# Patient Record
Sex: FEMALE | Race: WHITE | Employment: OTHER | URBAN - METROPOLITAN AREA
[De-identification: names, ages, dates, MRNs, and addresses within clinical notes are randomized per-mention and may not be internally consistent; named-entity substitution may affect disease eponyms.]

---

## 2019-01-01 ENCOUNTER — APPOINTMENT (OUTPATIENT)
Dept: MRI IMAGING | Age: 84
DRG: 391 | End: 2019-01-01
Payer: MEDICARE

## 2019-01-01 ENCOUNTER — APPOINTMENT (OUTPATIENT)
Dept: CT IMAGING | Age: 84
DRG: 391 | End: 2019-01-01
Payer: MEDICARE

## 2019-01-01 ENCOUNTER — HOSPITAL ENCOUNTER (EMERGENCY)
Age: 84
Discharge: HOME | End: 2019-12-27
Attending: EMERGENCY MEDICINE
Payer: MEDICARE

## 2019-01-01 ENCOUNTER — APPOINTMENT (OUTPATIENT)
Dept: GENERAL RADIOLOGY | Age: 84
DRG: 391 | End: 2019-01-01
Payer: MEDICARE

## 2019-01-01 ENCOUNTER — APPOINTMENT (OUTPATIENT)
Dept: CT IMAGING | Age: 84
End: 2019-01-01
Payer: MEDICARE

## 2019-01-01 ENCOUNTER — TELEPHONE (OUTPATIENT)
Dept: OTHER | Facility: CLINIC | Age: 84
End: 2019-01-01

## 2019-01-01 ENCOUNTER — HOSPITAL ENCOUNTER (INPATIENT)
Age: 84
LOS: 5 days | Discharge: NURSING, REHAB OR LONG-TERM CARE | DRG: 391 | End: 2019-12-22
Attending: EMERGENCY MEDICINE | Admitting: INTERNAL MEDICINE
Payer: MEDICARE

## 2019-01-01 VITALS
OXYGEN SATURATION: 91 % | BODY MASS INDEX: 24.38 KG/M2 | WEIGHT: 132.5 LBS | HEIGHT: 62 IN | TEMPERATURE: 97 F | HEART RATE: 71 BPM | DIASTOLIC BLOOD PRESSURE: 59 MMHG | RESPIRATION RATE: 14 BRPM | SYSTOLIC BLOOD PRESSURE: 132 MMHG

## 2019-01-01 VITALS
DIASTOLIC BLOOD PRESSURE: 84 MMHG | HEIGHT: 62 IN | WEIGHT: 132 LBS | TEMPERATURE: 97.9 F | HEART RATE: 64 BPM | OXYGEN SATURATION: 96 % | RESPIRATION RATE: 20 BRPM | BODY MASS INDEX: 24.29 KG/M2 | SYSTOLIC BLOOD PRESSURE: 159 MMHG

## 2019-01-01 DIAGNOSIS — K59.00 CONSTIPATION, UNSPECIFIED CONSTIPATION TYPE: ICD-10-CM

## 2019-01-01 DIAGNOSIS — N39.0 URINARY TRACT INFECTION WITHOUT HEMATURIA, SITE UNSPECIFIED: ICD-10-CM

## 2019-01-01 DIAGNOSIS — R10.32 ABDOMINAL PAIN, LEFT LOWER QUADRANT: Primary | ICD-10-CM

## 2019-01-01 DIAGNOSIS — K57.90 DIVERTICULOSIS: ICD-10-CM

## 2019-01-01 LAB
ALBUMIN SERPL-MCNC: 2.8 G/DL (ref 3.5–5.2)
ALBUMIN SERPL-MCNC: 2.9 G/DL (ref 3.5–5.2)
ALBUMIN SERPL-MCNC: 3.2 G/DL (ref 3.5–5.2)
ALBUMIN SERPL-MCNC: 4 G/DL (ref 3.5–5.2)
ALP BLD-CCNC: 123 U/L (ref 35–104)
ALP BLD-CCNC: 150 U/L (ref 35–104)
ALP BLD-CCNC: 164 U/L (ref 35–104)
ALP BLD-CCNC: 221 U/L (ref 35–104)
ALT SERPL-CCNC: 17 U/L (ref 0–32)
ALT SERPL-CCNC: 27 U/L (ref 0–32)
ALT SERPL-CCNC: 44 U/L (ref 0–32)
ALT SERPL-CCNC: 9 U/L (ref 0–32)
AMMONIA: 27 UMOL/L (ref 11–51)
ANION GAP SERPL CALCULATED.3IONS-SCNC: 10 MMOL/L (ref 7–16)
ANION GAP SERPL CALCULATED.3IONS-SCNC: 11 MMOL/L (ref 7–16)
ANION GAP SERPL CALCULATED.3IONS-SCNC: 11 MMOL/L (ref 7–16)
ANION GAP SERPL CALCULATED.3IONS-SCNC: 12 MMOL/L (ref 7–16)
ANION GAP SERPL CALCULATED.3IONS-SCNC: 12 MMOL/L (ref 7–16)
ANION GAP SERPL CALCULATED.3IONS-SCNC: 14 MMOL/L (ref 7–16)
ANION GAP SERPL CALCULATED.3IONS-SCNC: 14 MMOL/L (ref 7–16)
ANISOCYTOSIS: ABNORMAL
AST SERPL-CCNC: 30 U/L (ref 0–31)
AST SERPL-CCNC: 32 U/L (ref 0–31)
AST SERPL-CCNC: 53 U/L (ref 0–31)
AST SERPL-CCNC: 92 U/L (ref 0–31)
BACTERIA: ABNORMAL /HPF
BACTERIA: ABNORMAL /HPF
BASOPHILS ABSOLUTE: 0.08 E9/L (ref 0–0.2)
BASOPHILS ABSOLUTE: 0.11 E9/L (ref 0–0.2)
BASOPHILS ABSOLUTE: 0.2 E9/L (ref 0–0.2)
BASOPHILS RELATIVE PERCENT: 1.1 % (ref 0–2)
BASOPHILS RELATIVE PERCENT: 1.7 % (ref 0–2)
BASOPHILS RELATIVE PERCENT: 3.5 % (ref 0–2)
BILIRUB SERPL-MCNC: 1.1 MG/DL (ref 0–1.2)
BILIRUB SERPL-MCNC: 1.2 MG/DL (ref 0–1.2)
BILIRUB SERPL-MCNC: 3.3 MG/DL (ref 0–1.2)
BILIRUB SERPL-MCNC: 4.8 MG/DL (ref 0–1.2)
BILIRUBIN DIRECT: 0.6 MG/DL (ref 0–0.3)
BILIRUBIN DIRECT: 1.6 MG/DL (ref 0–0.3)
BILIRUBIN URINE: NEGATIVE
BILIRUBIN URINE: NEGATIVE
BILIRUBIN, INDIRECT: 0.6 MG/DL (ref 0–1)
BILIRUBIN, INDIRECT: 1.7 MG/DL (ref 0–1)
BLASTS RELATIVE PERCENT: 0.9 % (ref 0–0)
BLOOD CULTURE, ROUTINE: NORMAL
BLOOD, URINE: ABNORMAL
BLOOD, URINE: NEGATIVE
BUN BLDV-MCNC: 31 MG/DL (ref 8–23)
BUN BLDV-MCNC: 31 MG/DL (ref 8–23)
BUN BLDV-MCNC: 34 MG/DL (ref 8–23)
BUN BLDV-MCNC: 35 MG/DL (ref 8–23)
BUN BLDV-MCNC: 35 MG/DL (ref 8–23)
BUN BLDV-MCNC: 36 MG/DL (ref 8–23)
BUN BLDV-MCNC: 36 MG/DL (ref 8–23)
CALCIUM SERPL-MCNC: 8.3 MG/DL (ref 8.6–10.2)
CALCIUM SERPL-MCNC: 8.3 MG/DL (ref 8.6–10.2)
CALCIUM SERPL-MCNC: 8.5 MG/DL (ref 8.6–10.2)
CALCIUM SERPL-MCNC: 8.6 MG/DL (ref 8.6–10.2)
CALCIUM SERPL-MCNC: 8.7 MG/DL (ref 8.6–10.2)
CALCIUM SERPL-MCNC: 8.7 MG/DL (ref 8.6–10.2)
CALCIUM SERPL-MCNC: 9.4 MG/DL (ref 8.6–10.2)
CASTS: ABNORMAL /LPF
CHLORIDE BLD-SCNC: 103 MMOL/L (ref 98–107)
CHLORIDE BLD-SCNC: 104 MMOL/L (ref 98–107)
CHLORIDE BLD-SCNC: 108 MMOL/L (ref 98–107)
CHLORIDE BLD-SCNC: 110 MMOL/L (ref 98–107)
CHLORIDE BLD-SCNC: 111 MMOL/L (ref 98–107)
CHLORIDE BLD-SCNC: 112 MMOL/L (ref 98–107)
CHLORIDE BLD-SCNC: 112 MMOL/L (ref 98–107)
CHLORIDE URINE RANDOM: <20 MMOL/L
CLARITY: ABNORMAL
CLARITY: CLEAR
CO2: 21 MMOL/L (ref 22–29)
CO2: 21 MMOL/L (ref 22–29)
CO2: 22 MMOL/L (ref 22–29)
CO2: 22 MMOL/L (ref 22–29)
CO2: 23 MMOL/L (ref 22–29)
CO2: 24 MMOL/L (ref 22–29)
CO2: 25 MMOL/L (ref 22–29)
COLOR: YELLOW
COLOR: YELLOW
CREAT SERPL-MCNC: 2 MG/DL (ref 0.5–1)
CREAT SERPL-MCNC: 2.1 MG/DL (ref 0.5–1)
CREAT SERPL-MCNC: 2.2 MG/DL (ref 0.5–1)
CREAT SERPL-MCNC: 2.5 MG/DL (ref 0.5–1)
CREATININE URINE: 105 MG/DL (ref 29–226)
CREATININE URINE: 116 MG/DL (ref 29–226)
EKG ATRIAL RATE: 64 BPM
EKG ATRIAL RATE: 88 BPM
EKG P AXIS: 51 DEGREES
EKG P AXIS: 76 DEGREES
EKG P-R INTERVAL: 192 MS
EKG P-R INTERVAL: 202 MS
EKG Q-T INTERVAL: 412 MS
EKG Q-T INTERVAL: 470 MS
EKG QRS DURATION: 120 MS
EKG QRS DURATION: 128 MS
EKG QTC CALCULATION (BAZETT): 484 MS
EKG QTC CALCULATION (BAZETT): 498 MS
EKG R AXIS: 53 DEGREES
EKG R AXIS: 8 DEGREES
EKG T AXIS: 148 DEGREES
EKG T AXIS: 151 DEGREES
EKG VENTRICULAR RATE: 64 BPM
EKG VENTRICULAR RATE: 88 BPM
EOSINOPHILS ABSOLUTE: 0.02 E9/L (ref 0.05–0.5)
EOSINOPHILS ABSOLUTE: 0.22 E9/L (ref 0.05–0.5)
EOSINOPHILS ABSOLUTE: 0.88 E9/L (ref 0.05–0.5)
EOSINOPHILS RELATIVE PERCENT: 0.3 % (ref 0–6)
EOSINOPHILS RELATIVE PERCENT: 15.8 % (ref 0–6)
EOSINOPHILS RELATIVE PERCENT: 3.5 % (ref 0–6)
EPITHELIAL CELLS, UA: ABNORMAL /HPF
FERRITIN: 293 NG/ML
FOLATE: 14.1 NG/ML (ref 4.8–24.2)
GFR AFRICAN AMERICAN: 22
GFR AFRICAN AMERICAN: 25
GFR AFRICAN AMERICAN: 27
GFR AFRICAN AMERICAN: 28
GFR NON-AFRICAN AMERICAN: 18 ML/MIN/1.73
GFR NON-AFRICAN AMERICAN: 21 ML/MIN/1.73
GFR NON-AFRICAN AMERICAN: 22 ML/MIN/1.73
GFR NON-AFRICAN AMERICAN: 23 ML/MIN/1.73
GLUCOSE BLD-MCNC: 104 MG/DL (ref 74–99)
GLUCOSE BLD-MCNC: 110 MG/DL (ref 74–99)
GLUCOSE BLD-MCNC: 113 MG/DL (ref 74–99)
GLUCOSE BLD-MCNC: 117 MG/DL (ref 74–99)
GLUCOSE BLD-MCNC: 92 MG/DL (ref 74–99)
GLUCOSE BLD-MCNC: 93 MG/DL (ref 74–99)
GLUCOSE BLD-MCNC: 95 MG/DL (ref 74–99)
GLUCOSE URINE: NEGATIVE MG/DL
GLUCOSE URINE: NEGATIVE MG/DL
HCT VFR BLD CALC: 25.8 % (ref 34–48)
HCT VFR BLD CALC: 25.9 % (ref 34–48)
HCT VFR BLD CALC: 31.1 % (ref 34–48)
HCT VFR BLD CALC: 31.5 % (ref 34–48)
HEMOGLOBIN: 10 G/DL (ref 11.5–15.5)
HEMOGLOBIN: 10.3 G/DL (ref 11.5–15.5)
HEMOGLOBIN: 8 G/DL (ref 11.5–15.5)
HEMOGLOBIN: 8.5 G/DL (ref 11.5–15.5)
HYPOCHROMIA: ABNORMAL
HYPOCHROMIA: ABNORMAL
IMMATURE GRANULOCYTES #: 0.1 E9/L
IMMATURE GRANULOCYTES %: 1.4 % (ref 0–5)
INFLUENZA A BY PCR: NOT DETECTED
INFLUENZA B BY PCR: NOT DETECTED
IRON SATURATION: 83 % (ref 15–50)
IRON: 149 MCG/DL (ref 37–145)
KETONES, URINE: NEGATIVE MG/DL
KETONES, URINE: NEGATIVE MG/DL
L. PNEUMOPHILA SEROGP 1 UR AG: NORMAL
LACTIC ACID: 2 MMOL/L (ref 0.5–2.2)
LACTIC ACID: 2.2 MMOL/L (ref 0.5–2.2)
LEUKOCYTE ESTERASE, URINE: ABNORMAL
LEUKOCYTE ESTERASE, URINE: ABNORMAL
LIPASE: 48 U/L (ref 13–60)
LIPASE: 65 U/L (ref 13–60)
LYMPHOCYTES ABSOLUTE: 0.9 E9/L (ref 1.5–4)
LYMPHOCYTES ABSOLUTE: 1.07 E9/L (ref 1.5–4)
LYMPHOCYTES ABSOLUTE: 2.62 E9/L (ref 1.5–4)
LYMPHOCYTES RELATIVE PERCENT: 15.8 % (ref 20–42)
LYMPHOCYTES RELATIVE PERCENT: 16.5 % (ref 20–42)
LYMPHOCYTES RELATIVE PERCENT: 35.5 % (ref 20–42)
MCH RBC QN AUTO: 36.4 PG (ref 26–35)
MCH RBC QN AUTO: 36.9 PG (ref 26–35)
MCH RBC QN AUTO: 37 PG (ref 26–35)
MCH RBC QN AUTO: 37.1 PG (ref 26–35)
MCHC RBC AUTO-ENTMCNC: 31 % (ref 32–34.5)
MCHC RBC AUTO-ENTMCNC: 32.2 % (ref 32–34.5)
MCHC RBC AUTO-ENTMCNC: 32.7 % (ref 32–34.5)
MCHC RBC AUTO-ENTMCNC: 32.8 % (ref 32–34.5)
MCV RBC AUTO: 112.9 FL (ref 80–99.9)
MCV RBC AUTO: 113.1 FL (ref 80–99.9)
MCV RBC AUTO: 115.2 FL (ref 80–99.9)
MCV RBC AUTO: 117.3 FL (ref 80–99.9)
METAMYELOCYTES RELATIVE PERCENT: 3.5 % (ref 0–1)
MICROALBUMIN UR-MCNC: 172.5 MG/L
MICROALBUMIN/CREAT UR-RTO: 164.3 (ref 0–30)
MONOCYTES ABSOLUTE: 0.25 E9/L (ref 0.1–0.95)
MONOCYTES ABSOLUTE: 0.34 E9/L (ref 0.1–0.95)
MONOCYTES ABSOLUTE: 0.98 E9/L (ref 0.1–0.95)
MONOCYTES RELATIVE PERCENT: 13.3 % (ref 2–12)
MONOCYTES RELATIVE PERCENT: 4.3 % (ref 2–12)
MONOCYTES RELATIVE PERCENT: 6.1 % (ref 2–12)
MYELOCYTE PERCENT: 3.5 % (ref 0–0)
NEUTROPHILS ABSOLUTE: 3.25 E9/L (ref 1.8–7.3)
NEUTROPHILS ABSOLUTE: 3.58 E9/L (ref 1.8–7.3)
NEUTROPHILS ABSOLUTE: 4.66 E9/L (ref 1.8–7.3)
NEUTROPHILS RELATIVE PERCENT: 48.4 % (ref 43–80)
NEUTROPHILS RELATIVE PERCENT: 50.9 % (ref 43–80)
NEUTROPHILS RELATIVE PERCENT: 73.9 % (ref 43–80)
NITRITE, URINE: NEGATIVE
NITRITE, URINE: NEGATIVE
OSMOLALITY URINE: 429 MOSM/KG (ref 300–900)
OVALOCYTES: ABNORMAL
PDW BLD-RTO: 15.9 FL (ref 11.5–15)
PDW BLD-RTO: 15.9 FL (ref 11.5–15)
PDW BLD-RTO: 16.1 FL (ref 11.5–15)
PDW BLD-RTO: 17.2 FL (ref 11.5–15)
PH UA: 6 (ref 5–9)
PH UA: 7 (ref 5–9)
PLATELET # BLD: 105 E9/L (ref 130–450)
PLATELET # BLD: 107 E9/L (ref 130–450)
PLATELET # BLD: 132 E9/L (ref 130–450)
PLATELET # BLD: 187 E9/L (ref 130–450)
PMV BLD AUTO: 12.8 FL (ref 7–12)
PMV BLD AUTO: 13.2 FL (ref 7–12)
PMV BLD AUTO: 13.2 FL (ref 7–12)
PMV BLD AUTO: 13.4 FL (ref 7–12)
POIKILOCYTES: ABNORMAL
POLYCHROMASIA: ABNORMAL
POTASSIUM REFLEX MAGNESIUM: 3.8 MMOL/L (ref 3.5–5)
POTASSIUM REFLEX MAGNESIUM: 4 MMOL/L (ref 3.5–5)
POTASSIUM REFLEX MAGNESIUM: 4.2 MMOL/L (ref 3.5–5)
POTASSIUM REFLEX MAGNESIUM: 4.3 MMOL/L (ref 3.5–5)
POTASSIUM REFLEX MAGNESIUM: 4.4 MMOL/L (ref 3.5–5)
POTASSIUM SERPL-SCNC: 4.2 MMOL/L (ref 3.5–5)
POTASSIUM SERPL-SCNC: 4.9 MMOL/L (ref 3.5–5)
POTASSIUM, UR: 47 MMOL/L
PRO-BNP: 3554 PG/ML (ref 0–450)
PROTEIN UA: ABNORMAL MG/DL
PROTEIN UA: NEGATIVE MG/DL
RBC # BLD: 2.2 E12/L (ref 3.5–5.5)
RBC # BLD: 2.29 E12/L (ref 3.5–5.5)
RBC # BLD: 2.7 E12/L (ref 3.5–5.5)
RBC # BLD: 2.79 E12/L (ref 3.5–5.5)
RBC UA: ABNORMAL /HPF (ref 0–2)
RBC UA: ABNORMAL /HPF (ref 0–2)
SCHISTOCYTES: ABNORMAL
SODIUM BLD-SCNC: 137 MMOL/L (ref 132–146)
SODIUM BLD-SCNC: 141 MMOL/L (ref 132–146)
SODIUM BLD-SCNC: 142 MMOL/L (ref 132–146)
SODIUM BLD-SCNC: 142 MMOL/L (ref 132–146)
SODIUM BLD-SCNC: 144 MMOL/L (ref 132–146)
SODIUM BLD-SCNC: 148 MMOL/L (ref 132–146)
SODIUM BLD-SCNC: 148 MMOL/L (ref 132–146)
SODIUM URINE: 31 MMOL/L
SPECIFIC GRAVITY UA: 1.01 (ref 1–1.03)
SPECIFIC GRAVITY UA: 1.01 (ref 1–1.03)
STREP PNEUMONIAE ANTIGEN, URINE: NORMAL
TARGET CELLS: ABNORMAL
TARGET CELLS: ABNORMAL
TOTAL IRON BINDING CAPACITY: 179 MCG/DL (ref 250–450)
TOTAL PROTEIN: 5.6 G/DL (ref 6.4–8.3)
TOTAL PROTEIN: 5.7 G/DL (ref 6.4–8.3)
TOTAL PROTEIN: 6.1 G/DL (ref 6.4–8.3)
TOTAL PROTEIN: 7.5 G/DL (ref 6.4–8.3)
TROPONIN: 0.02 NG/ML (ref 0–0.03)
TROPONIN: 0.02 NG/ML (ref 0–0.03)
UROBILINOGEN, URINE: 0.2 E.U./DL
UROBILINOGEN, URINE: 0.2 E.U./DL
VITAMIN B-12: 912 PG/ML (ref 211–946)
WBC # BLD: 5.6 E9/L (ref 4.5–11.5)
WBC # BLD: 6.3 E9/L (ref 4.5–11.5)
WBC # BLD: 7.4 E9/L (ref 4.5–11.5)
WBC # BLD: 7.7 E9/L (ref 4.5–11.5)
WBC UA: >20 /HPF (ref 0–5)
WBC UA: ABNORMAL /HPF (ref 0–5)

## 2019-01-01 PROCEDURE — 71045 X-RAY EXAM CHEST 1 VIEW: CPT

## 2019-01-01 PROCEDURE — 2700000000 HC OXYGEN THERAPY PER DAY

## 2019-01-01 PROCEDURE — 6360000002 HC RX W HCPCS: Performed by: PHYSICIAN ASSISTANT

## 2019-01-01 PROCEDURE — 83880 ASSAY OF NATRIURETIC PEPTIDE: CPT

## 2019-01-01 PROCEDURE — 82140 ASSAY OF AMMONIA: CPT

## 2019-01-01 PROCEDURE — 96376 TX/PRO/DX INJ SAME DRUG ADON: CPT

## 2019-01-01 PROCEDURE — 94640 AIRWAY INHALATION TREATMENT: CPT

## 2019-01-01 PROCEDURE — 6370000000 HC RX 637 (ALT 250 FOR IP): Performed by: PHYSICIAN ASSISTANT

## 2019-01-01 PROCEDURE — 2580000003 HC RX 258: Performed by: STUDENT IN AN ORGANIZED HEALTH CARE EDUCATION/TRAINING PROGRAM

## 2019-01-01 PROCEDURE — 2580000003 HC RX 258: Performed by: EMERGENCY MEDICINE

## 2019-01-01 PROCEDURE — 74176 CT ABD & PELVIS W/O CONTRAST: CPT

## 2019-01-01 PROCEDURE — 84133 ASSAY OF URINE POTASSIUM: CPT

## 2019-01-01 PROCEDURE — 85025 COMPLETE CBC W/AUTO DIFF WBC: CPT

## 2019-01-01 PROCEDURE — 87040 BLOOD CULTURE FOR BACTERIA: CPT

## 2019-01-01 PROCEDURE — 94760 N-INVAS EAR/PLS OXIMETRY 1: CPT

## 2019-01-01 PROCEDURE — 2060000000 HC ICU INTERMEDIATE R&B

## 2019-01-01 PROCEDURE — 85027 COMPLETE CBC AUTOMATED: CPT

## 2019-01-01 PROCEDURE — 6370000000 HC RX 637 (ALT 250 FOR IP): Performed by: INTERNAL MEDICINE

## 2019-01-01 PROCEDURE — 80048 BASIC METABOLIC PNL TOTAL CA: CPT

## 2019-01-01 PROCEDURE — 51798 US URINE CAPACITY MEASURE: CPT

## 2019-01-01 PROCEDURE — 96365 THER/PROPH/DIAG IV INF INIT: CPT

## 2019-01-01 PROCEDURE — 84300 ASSAY OF URINE SODIUM: CPT

## 2019-01-01 PROCEDURE — 80053 COMPREHEN METABOLIC PANEL: CPT

## 2019-01-01 PROCEDURE — 84484 ASSAY OF TROPONIN QUANT: CPT

## 2019-01-01 PROCEDURE — 2500000003 HC RX 250 WO HCPCS: Performed by: INTERNAL MEDICINE

## 2019-01-01 PROCEDURE — 83605 ASSAY OF LACTIC ACID: CPT

## 2019-01-01 PROCEDURE — 83935 ASSAY OF URINE OSMOLALITY: CPT

## 2019-01-01 PROCEDURE — 82570 ASSAY OF URINE CREATININE: CPT

## 2019-01-01 PROCEDURE — 93005 ELECTROCARDIOGRAM TRACING: CPT | Performed by: EMERGENCY MEDICINE

## 2019-01-01 PROCEDURE — 97530 THERAPEUTIC ACTIVITIES: CPT

## 2019-01-01 PROCEDURE — 96374 THER/PROPH/DIAG INJ IV PUSH: CPT

## 2019-01-01 PROCEDURE — 82436 ASSAY OF URINE CHLORIDE: CPT

## 2019-01-01 PROCEDURE — 96368 THER/DIAG CONCURRENT INF: CPT

## 2019-01-01 PROCEDURE — 36415 COLL VENOUS BLD VENIPUNCTURE: CPT

## 2019-01-01 PROCEDURE — 87502 INFLUENZA DNA AMP PROBE: CPT

## 2019-01-01 PROCEDURE — 6360000002 HC RX W HCPCS: Performed by: EMERGENCY MEDICINE

## 2019-01-01 PROCEDURE — 2580000003 HC RX 258: Performed by: PHYSICIAN ASSISTANT

## 2019-01-01 PROCEDURE — 6360000002 HC RX W HCPCS: Performed by: STUDENT IN AN ORGANIZED HEALTH CARE EDUCATION/TRAINING PROGRAM

## 2019-01-01 PROCEDURE — 99284 EMERGENCY DEPT VISIT MOD MDM: CPT

## 2019-01-01 PROCEDURE — 2580000003 HC RX 258: Performed by: INTERNAL MEDICINE

## 2019-01-01 PROCEDURE — 2500000003 HC RX 250 WO HCPCS: Performed by: EMERGENCY MEDICINE

## 2019-01-01 PROCEDURE — 97166 OT EVAL MOD COMPLEX 45 MIN: CPT

## 2019-01-01 PROCEDURE — 93010 ELECTROCARDIOGRAM REPORT: CPT | Performed by: INTERNAL MEDICINE

## 2019-01-01 PROCEDURE — 82746 ASSAY OF FOLIC ACID SERUM: CPT

## 2019-01-01 PROCEDURE — 1200000000 HC SEMI PRIVATE

## 2019-01-01 PROCEDURE — 82044 UR ALBUMIN SEMIQUANTITATIVE: CPT

## 2019-01-01 PROCEDURE — 83540 ASSAY OF IRON: CPT

## 2019-01-01 PROCEDURE — 83550 IRON BINDING TEST: CPT

## 2019-01-01 PROCEDURE — 80076 HEPATIC FUNCTION PANEL: CPT

## 2019-01-01 PROCEDURE — 87450 HC DIRECT STREP B ANTIGEN: CPT

## 2019-01-01 PROCEDURE — 97535 SELF CARE MNGMENT TRAINING: CPT

## 2019-01-01 PROCEDURE — 99232 SBSQ HOSP IP/OBS MODERATE 35: CPT | Performed by: SURGERY

## 2019-01-01 PROCEDURE — 6360000002 HC RX W HCPCS: Performed by: FAMILY MEDICINE

## 2019-01-01 PROCEDURE — 81001 URINALYSIS AUTO W/SCOPE: CPT

## 2019-01-01 PROCEDURE — 82728 ASSAY OF FERRITIN: CPT

## 2019-01-01 PROCEDURE — 6360000002 HC RX W HCPCS: Performed by: INTERNAL MEDICINE

## 2019-01-01 PROCEDURE — 94761 N-INVAS EAR/PLS OXIMETRY MLT: CPT

## 2019-01-01 PROCEDURE — 94664 DEMO&/EVAL PT USE INHALER: CPT

## 2019-01-01 PROCEDURE — 74181 MRI ABDOMEN W/O CONTRAST: CPT

## 2019-01-01 PROCEDURE — 83690 ASSAY OF LIPASE: CPT

## 2019-01-01 PROCEDURE — 97162 PT EVAL MOD COMPLEX 30 MIN: CPT

## 2019-01-01 PROCEDURE — 96366 THER/PROPH/DIAG IV INF ADDON: CPT

## 2019-01-01 PROCEDURE — 70450 CT HEAD/BRAIN W/O DYE: CPT

## 2019-01-01 PROCEDURE — 82607 VITAMIN B-12: CPT

## 2019-01-01 PROCEDURE — 99285 EMERGENCY DEPT VISIT HI MDM: CPT

## 2019-01-01 RX ORDER — METRONIDAZOLE 500 MG/1
500 TABLET ORAL 3 TIMES DAILY
Qty: 30 TABLET | Refills: 0 | Status: SHIPPED | OUTPATIENT
Start: 2019-01-01 | End: 2020-01-01

## 2019-01-01 RX ORDER — POLYETHYLENE GLYCOL 3350 17 G/17G
17 POWDER, FOR SOLUTION ORAL DAILY
Qty: 510 G | Refills: 0 | Status: SHIPPED | OUTPATIENT
Start: 2019-01-01 | End: 2020-01-01

## 2019-01-01 RX ORDER — DEXTROSE AND SODIUM CHLORIDE 5; .45 G/100ML; G/100ML
INJECTION, SOLUTION INTRAVENOUS CONTINUOUS
Status: DISCONTINUED | OUTPATIENT
Start: 2019-01-01 | End: 2019-01-01

## 2019-01-01 RX ORDER — FUROSEMIDE 10 MG/ML
20 INJECTION INTRAMUSCULAR; INTRAVENOUS ONCE
Status: COMPLETED | OUTPATIENT
Start: 2019-01-01 | End: 2019-01-01

## 2019-01-01 RX ORDER — FENTANYL CITRATE 50 UG/ML
25 INJECTION, SOLUTION INTRAMUSCULAR; INTRAVENOUS ONCE
Status: COMPLETED | OUTPATIENT
Start: 2019-01-01 | End: 2019-01-01

## 2019-01-01 RX ORDER — POTASSIUM CHLORIDE 750 MG/1
10 CAPSULE, EXTENDED RELEASE ORAL DAILY
COMMUNITY

## 2019-01-01 RX ORDER — SODIUM CHLORIDE 0.9 % (FLUSH) 0.9 %
10 SYRINGE (ML) INJECTION EVERY 12 HOURS SCHEDULED
Status: DISCONTINUED | OUTPATIENT
Start: 2019-01-01 | End: 2019-01-01 | Stop reason: HOSPADM

## 2019-01-01 RX ORDER — DOCUSATE SODIUM 100 MG/1
100 CAPSULE, LIQUID FILLED ORAL 2 TIMES DAILY
Qty: 60 CAPSULE | Refills: 0 | Status: SHIPPED | OUTPATIENT
Start: 2019-01-01

## 2019-01-01 RX ORDER — ISOSORBIDE DINITRATE 10 MG/1
5 TABLET ORAL 2 TIMES DAILY
Status: DISCONTINUED | OUTPATIENT
Start: 2019-01-01 | End: 2019-01-01 | Stop reason: HOSPADM

## 2019-01-01 RX ORDER — SODIUM CHLORIDE 9 MG/ML
INJECTION, SOLUTION INTRAVENOUS CONTINUOUS
Status: DISCONTINUED | OUTPATIENT
Start: 2019-01-01 | End: 2019-01-01

## 2019-01-01 RX ORDER — 0.9 % SODIUM CHLORIDE 0.9 %
1000 INTRAVENOUS SOLUTION INTRAVENOUS ONCE
Status: COMPLETED | OUTPATIENT
Start: 2019-01-01 | End: 2019-01-01

## 2019-01-01 RX ORDER — KETOROLAC TROMETHAMINE 30 MG/ML
15 INJECTION, SOLUTION INTRAMUSCULAR; INTRAVENOUS ONCE
Status: DISCONTINUED | OUTPATIENT
Start: 2019-01-01 | End: 2019-01-01

## 2019-01-01 RX ORDER — FUROSEMIDE 20 MG/1
20 TABLET ORAL DAILY
Status: DISCONTINUED | OUTPATIENT
Start: 2019-01-01 | End: 2019-01-01 | Stop reason: HOSPADM

## 2019-01-01 RX ORDER — ASPIRIN 81 MG/1
81 TABLET, CHEWABLE ORAL DAILY
COMMUNITY

## 2019-01-01 RX ORDER — LEVOTHYROXINE SODIUM 0.05 MG/1
50 TABLET ORAL DAILY
Status: DISCONTINUED | OUTPATIENT
Start: 2019-01-01 | End: 2019-01-01 | Stop reason: HOSPADM

## 2019-01-01 RX ORDER — ASPIRIN 81 MG/1
81 TABLET, CHEWABLE ORAL DAILY
Status: DISCONTINUED | OUTPATIENT
Start: 2019-01-01 | End: 2019-01-01 | Stop reason: HOSPADM

## 2019-01-01 RX ORDER — ONDANSETRON 2 MG/ML
4 INJECTION INTRAMUSCULAR; INTRAVENOUS EVERY 6 HOURS PRN
Status: DISCONTINUED | OUTPATIENT
Start: 2019-01-01 | End: 2019-01-01 | Stop reason: HOSPADM

## 2019-01-01 RX ORDER — CIPROFLOXACIN 250 MG/1
250 TABLET, FILM COATED ORAL 2 TIMES DAILY
Qty: 14 TABLET | Refills: 0 | Status: SHIPPED | OUTPATIENT
Start: 2019-01-01 | End: 2020-01-01

## 2019-01-01 RX ORDER — ACETAMINOPHEN 325 MG/1
650 TABLET ORAL EVERY 4 HOURS PRN
Status: DISCONTINUED | OUTPATIENT
Start: 2019-01-01 | End: 2019-01-01 | Stop reason: HOSPADM

## 2019-01-01 RX ORDER — 0.9 % SODIUM CHLORIDE 0.9 %
250 INTRAVENOUS SOLUTION INTRAVENOUS ONCE
Status: COMPLETED | OUTPATIENT
Start: 2019-01-01 | End: 2019-01-01

## 2019-01-01 RX ORDER — LEVOTHYROXINE SODIUM 0.05 MG/1
50 TABLET ORAL DAILY
COMMUNITY

## 2019-01-01 RX ORDER — FUROSEMIDE 40 MG/1
40 TABLET ORAL DAILY
Status: ON HOLD | COMMUNITY
End: 2019-01-01 | Stop reason: HOSPADM

## 2019-01-01 RX ORDER — IPRATROPIUM BROMIDE AND ALBUTEROL SULFATE 2.5; .5 MG/3ML; MG/3ML
1 SOLUTION RESPIRATORY (INHALATION)
Status: DISCONTINUED | OUTPATIENT
Start: 2019-01-01 | End: 2019-01-01 | Stop reason: HOSPADM

## 2019-01-01 RX ORDER — ISOSORBIDE DINITRATE 10 MG/1
5 TABLET ORAL 2 TIMES DAILY
COMMUNITY

## 2019-01-01 RX ORDER — SODIUM CHLORIDE 0.9 % (FLUSH) 0.9 %
10 SYRINGE (ML) INJECTION PRN
Status: DISCONTINUED | OUTPATIENT
Start: 2019-01-01 | End: 2019-01-01 | Stop reason: HOSPADM

## 2019-01-01 RX ORDER — FUROSEMIDE 20 MG/1
20 TABLET ORAL DAILY
Qty: 60 TABLET | Refills: 3 | Status: SHIPPED | OUTPATIENT
Start: 2019-01-01

## 2019-01-01 RX ADMIN — SODIUM CHLORIDE: 9 INJECTION, SOLUTION INTRAVENOUS at 10:22

## 2019-01-01 RX ADMIN — METRONIDAZOLE 500 MG: 500 INJECTION, SOLUTION INTRAVENOUS at 04:11

## 2019-01-01 RX ADMIN — IPRATROPIUM BROMIDE AND ALBUTEROL SULFATE 1 AMPULE: 2.5; .5 SOLUTION RESPIRATORY (INHALATION) at 20:18

## 2019-01-01 RX ADMIN — METRONIDAZOLE 500 MG: 500 INJECTION, SOLUTION INTRAVENOUS at 03:11

## 2019-01-01 RX ADMIN — IPRATROPIUM BROMIDE AND ALBUTEROL SULFATE 1 AMPULE: 2.5; .5 SOLUTION RESPIRATORY (INHALATION) at 18:08

## 2019-01-01 RX ADMIN — ASPIRIN 81 MG 81 MG: 81 TABLET ORAL at 09:05

## 2019-01-01 RX ADMIN — ISOSORBIDE DINITRATE 5 MG: 10 TABLET ORAL at 20:03

## 2019-01-01 RX ADMIN — METOPROLOL TARTRATE 25 MG: 25 TABLET ORAL at 09:05

## 2019-01-01 RX ADMIN — ASPIRIN 81 MG 81 MG: 81 TABLET ORAL at 09:13

## 2019-01-01 RX ADMIN — ACETAMINOPHEN 650 MG: 325 TABLET, FILM COATED ORAL at 04:38

## 2019-01-01 RX ADMIN — IPRATROPIUM BROMIDE AND ALBUTEROL SULFATE 1 AMPULE: 2.5; .5 SOLUTION RESPIRATORY (INHALATION) at 14:57

## 2019-01-01 RX ADMIN — SODIUM CHLORIDE: 9 INJECTION, SOLUTION INTRAVENOUS at 18:43

## 2019-01-01 RX ADMIN — FUROSEMIDE 20 MG: 10 INJECTION, SOLUTION INTRAMUSCULAR; INTRAVENOUS at 11:02

## 2019-01-01 RX ADMIN — CEFTRIAXONE SODIUM 1 G: 1 INJECTION, POWDER, FOR SOLUTION INTRAMUSCULAR; INTRAVENOUS at 22:30

## 2019-01-01 RX ADMIN — IPRATROPIUM BROMIDE AND ALBUTEROL SULFATE 1 AMPULE: 2.5; .5 SOLUTION RESPIRATORY (INHALATION) at 20:48

## 2019-01-01 RX ADMIN — LEVOTHYROXINE SODIUM 50 MCG: 50 TABLET ORAL at 06:40

## 2019-01-01 RX ADMIN — SODIUM CHLORIDE, PRESERVATIVE FREE 10 ML: 5 INJECTION INTRAVENOUS at 20:01

## 2019-01-01 RX ADMIN — SODIUM CHLORIDE: 9 INJECTION, SOLUTION INTRAVENOUS at 05:56

## 2019-01-01 RX ADMIN — METOPROLOL TARTRATE 12.5 MG: 25 TABLET ORAL at 13:38

## 2019-01-01 RX ADMIN — METRONIDAZOLE 500 MG: 500 INJECTION, SOLUTION INTRAVENOUS at 09:10

## 2019-01-01 RX ADMIN — FUROSEMIDE 20 MG: 20 TABLET ORAL at 09:06

## 2019-01-01 RX ADMIN — IPRATROPIUM BROMIDE AND ALBUTEROL SULFATE 1 AMPULE: 2.5; .5 SOLUTION RESPIRATORY (INHALATION) at 21:39

## 2019-01-01 RX ADMIN — LEVOTHYROXINE SODIUM 50 MCG: 50 TABLET ORAL at 13:38

## 2019-01-01 RX ADMIN — CEFTRIAXONE SODIUM 1 G: 1 INJECTION, POWDER, FOR SOLUTION INTRAMUSCULAR; INTRAVENOUS at 23:34

## 2019-01-01 RX ADMIN — METOPROLOL TARTRATE 25 MG: 25 TABLET ORAL at 09:13

## 2019-01-01 RX ADMIN — ASPIRIN 81 MG 81 MG: 81 TABLET ORAL at 13:38

## 2019-01-01 RX ADMIN — ISOSORBIDE DINITRATE 5 MG: 10 TABLET ORAL at 09:13

## 2019-01-01 RX ADMIN — SODIUM CHLORIDE, PRESERVATIVE FREE 10 ML: 5 INJECTION INTRAVENOUS at 20:02

## 2019-01-01 RX ADMIN — METRONIDAZOLE 500 MG: 500 INJECTION, SOLUTION INTRAVENOUS at 11:51

## 2019-01-01 RX ADMIN — METRONIDAZOLE 500 MG: 500 INJECTION, SOLUTION INTRAVENOUS at 03:21

## 2019-01-01 RX ADMIN — IPRATROPIUM BROMIDE AND ALBUTEROL SULFATE 1 AMPULE: 2.5; .5 SOLUTION RESPIRATORY (INHALATION) at 09:44

## 2019-01-01 RX ADMIN — ENOXAPARIN SODIUM 30 MG: 30 INJECTION SUBCUTANEOUS at 09:14

## 2019-01-01 RX ADMIN — ENOXAPARIN SODIUM 30 MG: 30 INJECTION SUBCUTANEOUS at 09:05

## 2019-01-01 RX ADMIN — SODIUM CHLORIDE 250 ML: 9 INJECTION, SOLUTION INTRAVENOUS at 03:30

## 2019-01-01 RX ADMIN — DEXTROSE AND SODIUM CHLORIDE: 5; 450 INJECTION, SOLUTION INTRAVENOUS at 20:02

## 2019-01-01 RX ADMIN — ENOXAPARIN SODIUM 30 MG: 30 INJECTION SUBCUTANEOUS at 11:51

## 2019-01-01 RX ADMIN — IPRATROPIUM BROMIDE AND ALBUTEROL SULFATE 1 AMPULE: 2.5; .5 SOLUTION RESPIRATORY (INHALATION) at 09:42

## 2019-01-01 RX ADMIN — FUROSEMIDE 20 MG: 10 INJECTION, SOLUTION INTRAMUSCULAR; INTRAVENOUS at 15:45

## 2019-01-01 RX ADMIN — ISOSORBIDE DINITRATE 5 MG: 10 TABLET ORAL at 13:38

## 2019-01-01 RX ADMIN — SODIUM CHLORIDE, PRESERVATIVE FREE 10 ML: 5 INJECTION INTRAVENOUS at 22:30

## 2019-01-01 RX ADMIN — METRONIDAZOLE 500 MG: 500 INJECTION, SOLUTION INTRAVENOUS at 20:01

## 2019-01-01 RX ADMIN — FENTANYL CITRATE 25 MCG: 50 INJECTION, SOLUTION INTRAMUSCULAR; INTRAVENOUS at 19:38

## 2019-01-01 RX ADMIN — LEVOTHYROXINE SODIUM 50 MCG: 50 TABLET ORAL at 05:39

## 2019-01-01 RX ADMIN — IPRATROPIUM BROMIDE AND ALBUTEROL SULFATE 1 AMPULE: 2.5; .5 SOLUTION RESPIRATORY (INHALATION) at 14:00

## 2019-01-01 RX ADMIN — METRONIDAZOLE 500 MG: 500 INJECTION, SOLUTION INTRAVENOUS at 02:53

## 2019-01-01 RX ADMIN — METRONIDAZOLE 500 MG: 500 INJECTION, SOLUTION INTRAVENOUS at 18:43

## 2019-01-01 RX ADMIN — ACETAMINOPHEN 650 MG: 325 TABLET, FILM COATED ORAL at 20:03

## 2019-01-01 RX ADMIN — IPRATROPIUM BROMIDE AND ALBUTEROL SULFATE 1 AMPULE: 2.5; .5 SOLUTION RESPIRATORY (INHALATION) at 09:35

## 2019-01-01 RX ADMIN — METOPROLOL TARTRATE 12.5 MG: 25 TABLET ORAL at 09:42

## 2019-01-01 RX ADMIN — ASPIRIN 81 MG 81 MG: 81 TABLET ORAL at 09:42

## 2019-01-01 RX ADMIN — ASPIRIN 81 MG 81 MG: 81 TABLET ORAL at 09:06

## 2019-01-01 RX ADMIN — ISOSORBIDE DINITRATE 5 MG: 10 TABLET ORAL at 09:42

## 2019-01-01 RX ADMIN — METRONIDAZOLE 500 MG: 500 INJECTION, SOLUTION INTRAVENOUS at 10:52

## 2019-01-01 RX ADMIN — ENOXAPARIN SODIUM 30 MG: 30 INJECTION SUBCUTANEOUS at 09:42

## 2019-01-01 RX ADMIN — SODIUM CHLORIDE, PRESERVATIVE FREE 10 ML: 5 INJECTION INTRAVENOUS at 09:06

## 2019-01-01 RX ADMIN — ISOSORBIDE DINITRATE 5 MG: 10 TABLET ORAL at 09:05

## 2019-01-01 RX ADMIN — FUROSEMIDE 20 MG: 20 TABLET ORAL at 09:05

## 2019-01-01 RX ADMIN — LEVOTHYROXINE SODIUM 50 MCG: 50 TABLET ORAL at 06:41

## 2019-01-01 RX ADMIN — DOXYCYCLINE 100 MG: 100 INJECTION, POWDER, LYOPHILIZED, FOR SOLUTION INTRAVENOUS at 22:36

## 2019-01-01 RX ADMIN — METOPROLOL TARTRATE 12.5 MG: 25 TABLET ORAL at 09:06

## 2019-01-01 RX ADMIN — SODIUM CHLORIDE 1000 ML: 9 INJECTION, SOLUTION INTRAVENOUS at 20:55

## 2019-01-01 RX ADMIN — METRONIDAZOLE 500 MG: 500 INJECTION, SOLUTION INTRAVENOUS at 18:44

## 2019-01-01 RX ADMIN — ENOXAPARIN SODIUM 30 MG: 30 INJECTION SUBCUTANEOUS at 09:06

## 2019-01-01 RX ADMIN — ISOSORBIDE DINITRATE 5 MG: 10 TABLET ORAL at 21:09

## 2019-01-01 RX ADMIN — CEFTRIAXONE SODIUM 1 G: 1 INJECTION, POWDER, FOR SOLUTION INTRAMUSCULAR; INTRAVENOUS at 22:24

## 2019-01-01 RX ADMIN — SODIUM CHLORIDE 1000 ML: 9 INJECTION, SOLUTION INTRAVENOUS at 16:52

## 2019-01-01 RX ADMIN — SODIUM CHLORIDE, PRESERVATIVE FREE 10 ML: 5 INJECTION INTRAVENOUS at 21:09

## 2019-01-01 RX ADMIN — METRONIDAZOLE 500 MG: 500 INJECTION, SOLUTION INTRAVENOUS at 11:02

## 2019-01-01 RX ADMIN — CEFTRIAXONE SODIUM 1 G: 1 INJECTION, POWDER, FOR SOLUTION INTRAMUSCULAR; INTRAVENOUS at 22:53

## 2019-01-01 RX ADMIN — FENTANYL CITRATE 25 MCG: 50 INJECTION, SOLUTION INTRAMUSCULAR; INTRAVENOUS at 16:52

## 2019-01-01 RX ADMIN — CEFTRIAXONE SODIUM 1 G: 1 INJECTION, POWDER, FOR SOLUTION INTRAMUSCULAR; INTRAVENOUS at 22:36

## 2019-01-01 RX ADMIN — ISOSORBIDE DINITRATE 5 MG: 10 TABLET ORAL at 21:22

## 2019-01-01 RX ADMIN — ISOSORBIDE DINITRATE 5 MG: 10 TABLET ORAL at 09:06

## 2019-01-01 RX ADMIN — SODIUM CHLORIDE, PRESERVATIVE FREE 10 ML: 5 INJECTION INTRAVENOUS at 11:01

## 2019-01-01 RX ADMIN — FUROSEMIDE 20 MG: 20 TABLET ORAL at 09:12

## 2019-01-01 RX ADMIN — METRONIDAZOLE 500 MG: 500 INJECTION, SOLUTION INTRAVENOUS at 10:20

## 2019-01-01 RX ADMIN — IPRATROPIUM BROMIDE AND ALBUTEROL SULFATE 1 AMPULE: 2.5; .5 SOLUTION RESPIRATORY (INHALATION) at 20:34

## 2019-01-01 RX ADMIN — FUROSEMIDE 20 MG: 10 INJECTION, SOLUTION INTRAMUSCULAR; INTRAVENOUS at 09:08

## 2019-01-01 RX ADMIN — IPRATROPIUM BROMIDE AND ALBUTEROL SULFATE 1 AMPULE: 2.5; .5 SOLUTION RESPIRATORY (INHALATION) at 17:32

## 2019-01-01 RX ADMIN — SODIUM CHLORIDE, PRESERVATIVE FREE 10 ML: 5 INJECTION INTRAVENOUS at 09:09

## 2019-01-01 RX ADMIN — METRONIDAZOLE 500 MG: 500 INJECTION, SOLUTION INTRAVENOUS at 18:45

## 2019-01-01 RX ADMIN — SODIUM CHLORIDE: 9 INJECTION, SOLUTION INTRAVENOUS at 00:31

## 2019-01-01 RX ADMIN — IPRATROPIUM BROMIDE AND ALBUTEROL SULFATE 1 AMPULE: 2.5; .5 SOLUTION RESPIRATORY (INHALATION) at 10:28

## 2019-01-01 RX ADMIN — IPRATROPIUM BROMIDE AND ALBUTEROL SULFATE 1 AMPULE: 2.5; .5 SOLUTION RESPIRATORY (INHALATION) at 16:32

## 2019-01-01 RX ADMIN — IPRATROPIUM BROMIDE AND ALBUTEROL SULFATE 1 AMPULE: 2.5; .5 SOLUTION RESPIRATORY (INHALATION) at 13:51

## 2019-01-01 RX ADMIN — LEVOTHYROXINE SODIUM 50 MCG: 50 TABLET ORAL at 05:45

## 2019-01-01 ASSESSMENT — PAIN DESCRIPTION - LOCATION
LOCATION: GENERALIZED
LOCATION: HIP;PELVIS
LOCATION: BACK
LOCATION: GENERALIZED
LOCATION: BACK

## 2019-01-01 ASSESSMENT — PAIN SCALES - GENERAL
PAINLEVEL_OUTOF10: 0
PAINLEVEL_OUTOF10: 7
PAINLEVEL_OUTOF10: 0
PAINLEVEL_OUTOF10: 0
PAINLEVEL_OUTOF10: 10
PAINLEVEL_OUTOF10: 0
PAINLEVEL_OUTOF10: 3
PAINLEVEL_OUTOF10: 8
PAINLEVEL_OUTOF10: 0
PAINLEVEL_OUTOF10: 1
PAINLEVEL_OUTOF10: 0
PAINLEVEL_OUTOF10: 4
PAINLEVEL_OUTOF10: 3
PAINLEVEL_OUTOF10: 0
PAINLEVEL_OUTOF10: 6
PAINLEVEL_OUTOF10: 0

## 2019-01-01 ASSESSMENT — ENCOUNTER SYMPTOMS
ABDOMINAL PAIN: 1
VOMITING: 0
COUGH: 0
NAUSEA: 0
COLOR CHANGE: 0
CONSTIPATION: 0
SHORTNESS OF BREATH: 0
DIARRHEA: 0
WHEEZING: 0

## 2019-01-01 ASSESSMENT — PAIN DESCRIPTION - DESCRIPTORS
DESCRIPTORS: ACHING
DESCRIPTORS: ACHING;DISCOMFORT;DULL

## 2019-01-01 ASSESSMENT — PAIN DESCRIPTION - PAIN TYPE
TYPE: CHRONIC PAIN
TYPE: CHRONIC PAIN
TYPE: ACUTE PAIN

## 2019-01-01 ASSESSMENT — PAIN SCALES - WONG BAKER: WONGBAKER_NUMERICALRESPONSE: 0

## 2019-12-17 PROBLEM — R17 JAUNDICE: Status: ACTIVE | Noted: 2019-01-01

## 2019-12-17 PROBLEM — R53.1 WEAKNESS: Status: ACTIVE | Noted: 2019-01-01

## 2019-12-17 PROBLEM — N18.9 CKD (CHRONIC KIDNEY DISEASE): Status: ACTIVE | Noted: 2019-01-01

## 2019-12-17 PROBLEM — J18.9 PNA (PNEUMONIA): Status: ACTIVE | Noted: 2019-01-01

## 2019-12-18 NOTE — ED NOTES
Bed: 22  Expected date:   Expected time:   Means of arrival:   Comments:  sofia Heart RN  12/17/19 2018

## 2019-12-18 NOTE — PROGRESS NOTES
OCCUPATIONAL THERAPY INITIAL EVALUATION      Date:2019  Patient Name: Doyle Nava  MRN: [id number]  : 1927  Room: [address] [place] [address]    Evaluating OT: CHARLENE Richard, OTR/L [id number]    AM-PAC Daily Activity Raw Score:   Recommended Adaptive Equipment: BSC, TBD     Diagnosis: weakness   Surgery: n/a   Pertinent Medical History: COPD   Precautions:  Falls, O2, no contact isolation (per RN )     Home Living: Pt lives alone in a 1 story home; bed/bath on main level   Bathroom setup: tub/shower unit   Equipment owned: shower chair, ww  Prior Level of Function: IND with ADLs/IADLs; using quad cane for functional mobility   Driving: no    Pain Level: 0/10  Cognition: A&O: 3/4; Follows 1 step directions   Memory:  fair    Sequencing:  fair    Problem solving:  fair    Judgement/safety:  fair      Functional Assessment:   Initial Eval Status  Date: 19 Treatment Status  Date: Short Term Goals  Treatment frequency: PRN    Feeding SUP   IND   Grooming SUP (seated EOB)   IND   UB Dressing Mod A   Min A   LB Dressing Dep (assist with B socks)  Mod A    Bathing Mod A (simulated task)  Min A    Toileting Mod A (assist to steady balance while standing for pants management and bruno hygiene)  Min A   Bed Mobility  Log roll: Mod A  Supine to sit: Mod A   Sit to supine: Min A   Log roll Min A  Supine to sit: Min A   Sit to supine: SUP   Functional Transfers Sit to stand: Mod A   Stand to sit:Mod A  Commode: Mod A  Min A   Functional Mobility NT  Min A   Balance Sitting:     Static:  SUP    Dynamic:Min A  Standing:  Mod A     Activity Tolerance fair     Visual/  Perceptual Glasses: yes                Hand dominance: both  UE ROM: BUE: elbow flex WFL, shoulder flex grossly 90'  Strength: RUE: grossly 3+/5 LUE: grossly 3+/5   Strength: B WFL  Fine Motor Coordination:  WFL    Hearing: WFL  Sensation:  No c/o numbness/tingling  Tone:  WFL  Edema: none noted                            Comments/Treatment: Cleared by RN to see pt. Upon arrival, patient supine in bed and agreeable to OT session. Mod A for bed mobility to sit EOB. Mod A for stand pivot bed-BSC. Required increased time for toileting task. Min A for sit-supine. Pt appeared to have tolerated session well. Pt appears motivated/cooperative/pleasant. At end of session, patient  Pt would benefit from continued OT to increase functional independence and quality of life. Eval Complexity: moderate  · History: Expanded chart review of medical records and additional review of physical, cognitive, or psychosocial history related to current functional performance  · Exam: 3+ performance deficits  · Assistance/Modification: mod/max assistance or modifications required to perform tasks. May have comorbidities that affect occupational performance. Assessment of current deficits   Functional mobility [x]  ADLs [x] Strength [x]  Cognition [x]  Functional transfers  [x] IADLs [x] Safety Awareness [x]  Endurance [x]  Fine Motor Coordination [] Balance [x] Vision/perception [] Sensation []   Gross Motor Coordination [] ROM [] Delirium []                  Motor Control []    Plan of Care:   ADL retraining [x]   Equipment needs [x]   Neuromuscular re-education [x] Energy Conservation Techniques [x]  Functional Transfer training [x] Patient and/or Family Education [x]  Functional Mobility training [x]  Environmental Modifications [x]  Cognitive re-training []   Compensatory techniques for ADLs [x]  Splinting Needs []   Positioning to improve overall function [x]   Therapeutic Activity [x]  Therapeutic Exercise  [x]  Visual/Perceptual: []    Delirium prevention/treatment  []   Other:  []    Rehab Potential: Good for established goals, pt. assisted in establishment of goals. Patient / Family Goal: not stated     Patient  instructed on diagnosis, prognosis/goals and plan of care. Demonstrated fair understanding.     [] Malnutrition indicators have been identified and nursing has been notified to ensure a dietitian consult is ordered. Evaluation time includes thorough review of current medical information, gathering information on past medical & social history & PLOF, completion of standardized testing, informal observation of tasks, consultation with other medical professions/disciplines, assessment of data & development of POC/goals. Tx. Time in: 2:20  Tx.  Time out: 2:40  moderate Evaluation + 20 timed treatment minutes    Aguilar Dumont, OTR/L [id number]

## 2019-12-18 NOTE — CARE COORDINATION
DANNY spoke with patients daughter, [address] via phone. She is patients POA. She states patient lives at home alone in a 1 story home. She is independent at home and uses a cane. Daughter reports patient has been very independent, she cooks, cleans, etc at home. PCP is Dr Lucinda Epley. Pharmacy is [facility] in [place]. She states [facility] history is with [facility] she thinks but not quite sure. No history of AUNDREA. Daughter reports if AUNDREA is needed they would consider it. Await therapy evals for further planning.

## 2019-12-18 NOTE — PLAN OF CARE
Problem: Falls - Risk of:  Goal: Will remain free from falls  Outcome: Met This Shift  Goal: Absence of physical injury  Outcome: Met This Shift

## 2019-12-18 NOTE — CONSULTS
GENERAL SURGERY  CONSULT NOTE  12/18/2019    Physician Consulted: Dr. Xiao Campbell  Reason for Consult: Elevated LFTs, cholelithiasis  Referring Physician: Dr. Dennys Marquez    RAHEEM Cardenas Sleeper is a 80 y.o. female who presents for evaluation of elevated LFTs and cholelithiasis. History is obtained from patient and EMR. Patient presented to the emergency department last evening via family from home for fatigue. Patient was reportedly found by family to be very fatigued and weak having difficulty getting out of bed. There were reports of episodes of diarrhea however the patient denies this. Patient does endorse feeling tired. She denies any fevers or chills or sweats shortness of breath or chest pain, anorexia, nausea vomiting or diarrhea or dysuria. General surgery service was consulted secondary to elevated LFTs and cholelithiasis seen on CT scan upon admission. Patient reports a history of a \"gallbladder drain\" she is unsure of when this was or her the surgeon was. She denies any other surgeries. She denies a history of tobacco use, alcohol use. She denies any current abdominal pain or nausea and states she has been eating and drinking here in the hospital without difficulty. History reviewed. No pertinent past medical history. History reviewed. No pertinent surgical history. Medications Prior to Admission:    Prior to Admission medications    Medication Sig Start Date End Date Taking?  Authorizing Provider   levothyroxine (SYNTHROID) 50 MCG tablet Take 50 mcg by mouth Daily   Yes Historical Provider, MD   aspirin 81 MG chewable tablet Take 81 mg by mouth daily   Yes Historical Provider, MD   metoprolol tartrate (LOPRESSOR) 25 MG tablet Take 12.5 mg by mouth daily   Yes Historical Provider, MD   furosemide (LASIX) 40 MG tablet Take 40 mg by mouth daily   Yes Historical Provider, MD   potassium chloride (MICRO-K) 10 MEQ extended release capsule Take 10 mEq by mouth daily   Yes Historical Provider, MD isosorbide dinitrate (ISORDIL) 10 MG tablet Take 5 mg by mouth 2 times daily   Yes Historical Provider, MD       Allergies   Allergen Reactions    Penicillins Hives       History reviewed. No pertinent family history. Social History     Tobacco Use    Smoking status: Never Smoker    Smokeless tobacco: Never Used   Substance Use Topics    Alcohol use: Not Currently    Drug use: Never         Review of Systems   General ROS: positive for  - fatigue  negative for - chills or fever  Hematological and Lymphatic ROS: negative for - jaundice, night sweats or weight loss  Respiratory ROS: no cough, shortness of breath, or wheezing  Cardiovascular ROS: no chest pain or dyspnea on exertion  Gastrointestinal ROS: positive for - diarrhea  negative for - abdominal pain, blood in stools, constipation, melena or nausea/vomiting  Genito-Urinary ROS: no dysuria, trouble voiding, or hematuria  Musculoskeletal ROS: negative for - muscle pain or muscular weakness  Neurologic: no seizures no focal weakness  Endocrine: no polydipsia, polyuria, temperature intolerance  Integumentary: no rashes      PHYSICAL EXAM:    Vitals:    12/18/19 1130   BP: (!) 160/65   Pulse: 78   Resp: 18   Temp: 98.6 °F (37 °C)   SpO2:        PHYSICAL EXAM  General: No apparent distress, comfortable elderly, frail white female, jaundiced  Head: Pupils equal round  Neck: trachea midline  Chest: Respiratory effort was normal with no retractions or use of accessory muscles. Cardiovascular: Heart sounds were normal with a regular rate and rhythm; harsh systolic murmur grade 3 out of 6  Abdomen:  Soft and non distended. Mild tenderness throughout to moderate palpation, guarding, rebound, or rigidity.   No varicosities  Extremities: Moves all 4 extremities  Skin: warm, dry      LABS:    CBC  Recent Labs     12/18/19  0746   WBC 6.3   HGB 8.5*   HCT 25.9*   *     BMP  Recent Labs     12/18/19  0746      K 4.4   *   CO2 21*   BUN 34* CREATININE 2.0*   CALCIUM 8.5*     Liver Function  Recent Labs     19   LIPASE 65*   BILITOT 4.8*   AST 92*   ALT 44*   ALKPHOS 221*   PROT 7.5   LABALBU 4.0       RADIOLOGY    Ct Abdomen Pelvis Wo Contrast    Result Date: 2019  Patient MRN:  [id number] : 1927 Age: 80 years Gender: Female Order Date:  2019 9:15 PM EXAM: CT ABDOMEN PELVIS WO CONTRAST COMPARISON: None INDICATION:  Pain Pain TECHNIQUE:  Low-dose CT acquisition technique included one of the following options; 1. Automated exposure control, 2. Adjustment of mA and/or kV according to the patient's size or 3. Use of iterative reconstruction. FINDINGS: There is no evidence of bowel obstruction or free air in the abdomen or pelvis. Note made of significant thickened appearance of the endometrial stripe with areas of calcification. Liver is unremarkable. Multiple tiny gallstones layer within the gallbladder. No evidence of acute pancreatitis. There is minimal peripancreatic ascites. No renal or ureteric calculus. There is no hydronephrosis. View of the lung bases shows peribronchial thickening with airspace opacities at lung bases and small bilateral pleural effusions. 1. Findings are suggestive of interstitial pulmonary edema or pneumonia in visualized lower lung fields with minimal bilateral pleural effusions. 2. Significant thickened appearance of endometrial stripe concerning for a pedunculated polyp, endometrial hyperplasia, or endometrial cancer. Clinical correlation recommended. 3. Diverticulosis without evidence of acute diverticulitis. 4. Minimal perisplenic ascites. 5. Cholelithiasis.  ALERT:  THIS IS AN ABNORMAL REPORT    Ct Head Wo Contrast    Result Date: 2019  Patient MRN:  [id number] : 1927 Age: 80 years Gender: Female Order Date:  2019 9:15 PM EXAM: CT HEAD WO CONTRAST COMPARISON: None INDICATION:  Evaluate intracranial abnormality Evaluate intracranial abnormality TECHNIQUE: Axial unenhanced CT scanning was performed through the head without the use of intravenous contrast. Low-dose CT acquisition technique included one of the following options; 1. Automated exposure control, 2. Adjustment of mA and/or kV according to the patient's size or 3. Use of iterative reconstruction. FINDINGS: No evidence of acute intracranial hemorrhage or edema. There is remote/old lacunar stroke involving head of the caudate nucleus on the left. No abnormal extra-axial fluid collections. Paranasal sinuses and mastoid air cells are clear. 1. No acute intracranial hemorrhage or edema. 2. 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 Xr Chest Portable    Result Date: 2019  Patient MRN:  [id number] : 1927 Age: 80 years Gender: Female Order Date:  2019 8:30 PM EXAM: XR CHEST PORTABLE COMPARISON: None INDICATION:  weak weak FINDINGS: There are opacities in retrocardiac left lower lobe and left costophrenic sulcus. The heart appears be normal in size. There is mild prominence of pulmonary vasculature. No pneumothorax. Findings could suggest pneumonia, atelectasis or effusion at left lung base. ASSESSMENT:  80 y.o. female with cholelithiasis and elevated LFTs concerning for possible bile duct obstruction.  - Diverticulosis no signs of surrounding diverticulitis  - Macrocytic anemia - stable, diluted today when compared to yesterday 2/2 IVF - Doubt hemolysis  - ISRA    PLAN:  Recheck LFTs, check breakdown of bilirubin. Obtain MRCP w/o  Medicine and nephrology following    To discuss with attending physician.       Electronically signed by Neetu Callahan DO on 19 at 3:27 PM

## 2019-12-18 NOTE — ED PROVIDER NOTES
or rubs. 2+ distal pulses. Abdomen: Soft, non tender, non distended  Extremities: Moves all extremities x 4. Warm and well perfused. Skin: warm and dry without rash. Mildly jaundiced.   Neurologic: CN 2-12 grossly intact, no focal deficits, no meningeal signs normal strength all extremities  Psych: Normal Affect.    -------------------------------------------------- RESULTS -------------------------------------------------  All laboratory and radiology results have been personally reviewed by myself   LABS:  Results for orders placed or performed during the hospital encounter of 12/17/19   CBC   Result Value Ref Range    WBC 7.7 4.5 - 11.5 E9/L    RBC 2.79 (L) 3.50 - 5.50 E12/L    Hemoglobin 10.3 (L) 11.5 - 15.5 g/dL    Hematocrit 31.5 (L) 34.0 - 48.0 %    .9 (H) 80.0 - 99.9 fL    MCH 36.9 (H) 26.0 - 35.0 pg    MCHC 32.7 32.0 - 34.5 %    RDW 15.9 (H) 11.5 - 15.0 fL    Platelets 276 624 - 686 E9/L    MPV 12.8 (H) 7.0 - 12.0 fL   Comprehensive Metabolic Panel   Result Value Ref Range    Sodium 142 132 - 146 mmol/L    Potassium 4.9 3.5 - 5.0 mmol/L    Chloride 103 98 - 107 mmol/L    CO2 25 22 - 29 mmol/L    Anion Gap 14 7 - 16 mmol/L    Glucose 117 (H) 74 - 99 mg/dL    BUN 35 (H) 8 - 23 mg/dL    CREATININE 2.2 (H) 0.5 - 1.0 mg/dL    GFR Non-African American 21 >=60 mL/min/1.73    GFR African American 25     Calcium 9.4 8.6 - 10.2 mg/dL    Total Protein 7.5 6.4 - 8.3 g/dL    Alb 4.0 3.5 - 5.2 g/dL    Total Bilirubin 4.8 (H) 0.0 - 1.2 mg/dL    Alkaline Phosphatase 221 (H) 35 - 104 U/L    ALT 44 (H) 0 - 32 U/L    AST 92 (H) 0 - 31 U/L   Troponin   Result Value Ref Range    Troponin 0.02 0.00 - 0.03 ng/mL   Urinalysis   Result Value Ref Range    Color, UA Yellow Straw/Yellow    Clarity, UA SL CLOUDY Clear    Glucose, Ur Negative Negative mg/dL    Bilirubin Urine Negative Negative    Ketones, Urine Negative Negative mg/dL    Specific Gravity, UA 1.010 1.005 - 1.030    Blood, Urine SMALL (A) Negative    pH, UA 7.0 5.0 - 9.0    Protein, UA TRACE Negative mg/dL    Urobilinogen, Urine 0.2 <2.0 E.U./dL    Nitrite, Urine Negative Negative    Leukocyte Esterase, Urine SMALL (A) Negative   Lactic Acid, Plasma   Result Value Ref Range    Lactic Acid 2.2 0.5 - 2.2 mmol/L   Microscopic Urinalysis   Result Value Ref Range    WBC, UA 2-5 0 - 5 /HPF    RBC, UA 2-5 0 - 2 /HPF    Bacteria, UA MANY (A) /HPF   EKG 12 Lead   Result Value Ref Range    Ventricular Rate 88 BPM    Atrial Rate 88 BPM    P-R Interval 202 ms    QRS Duration 120 ms    Q-T Interval 412 ms    QTc Calculation (Bazett) 498 ms    P Axis 51 degrees    R Axis 53 degrees    T Axis 151 degrees       RADIOLOGY:  Interpreted by Radiologist.  CT Head WO Contrast   Final Result      1. No acute intracranial hemorrhage or edema. 2. Chronic lacunar stroke involving head of the caudate nucleus on the   left. CT ABDOMEN PELVIS WO CONTRAST   Final Result      1. Findings are suggestive of interstitial pulmonary edema or   pneumonia in visualized lower lung fields with minimal bilateral   pleural effusions. 2. Significant thickened appearance of endometrial stripe concerning   for a pedunculated polyp, endometrial hyperplasia, or endometrial   cancer. Clinical correlation recommended. 3. Diverticulosis without evidence of acute diverticulitis. 4. Minimal perisplenic ascites. 5. Cholelithiasis. ALERT:  THIS IS AN ABNORMAL REPORT      XR CHEST PORTABLE   Final Result      Findings could suggest pneumonia, atelectasis or effusion at left lung   base. EKG: This EKG is signed and interpreted by the EP. Time: 2032  Rate: 88  Rhythm: Sinus  Interpretation: nonspecific ST changes  Comparison: no previous EKG available    ------------------------- NURSING NOTES AND VITALS REVIEWED ---------------------------  The nursing notes within the ED encounter and vital signs as below have been reviewed.    BP (!) 143/109   Pulse 87   Temp 97.8 °F (36.6 °C) Resp 16   SpO2 94%   Oxygen Saturation Interpretation: Normal    ------------------------------- ED COURSE/MEDICAL DECISION MAKING----------------------  Medications   0.9 % sodium chloride bolus (1,000 mLs Intravenous New Bag 12/17/19 2055)   cefTRIAXone (ROCEPHIN) 1 g in dextrose 5 % 50 mL IVPB (vial-mate) (has no administration in time range)   doxycycline (VIBRAMYCIN) 100 mg in dextrose 5 % 100 mL IVPB (has no administration in time range)       Medical Decision Making: This patient's ED course included: a personal history and physicial examination and re-evaluation prior to disposition. This patient has remained hemodynamically stable during their ED course. IV fluids given, CXR, labs, and EKG obtained. All results reviewed and discussed with the pt and family. Reevaluation:  Rechecked and vital signs noted. Patient is in no acute distress. Family and patient made aware of findings and plan. Patient abdomen is soft and nontender on reexamination  Call placed to internal medicine on-call    Counseling: The emergency provider has spoken with the patient and family and discussed todays results, in addition to providing specific details for the plan of care and counseling regarding the diagnosis and prognosis. Questions are answered at this time and they are agreeable with the plan.      --------------------------------- IMPRESSION AND DISPOSITION ---------------------------------    IMPRESSION  1. Generalized weakness    2. Jaundice    3. Chronic kidney disease, unspecified CKD stage    4. Pneumonia due to organism    5. Gallstones        DISPOSITION  Disposition: Admit to telemetry  Patient condition is stable    SCRIBE ATTESTATION    12/17/19, 8:29 PM.    This note is prepared by Silvino Farooq, acting as Scribe for Tabby Escamilla MD.    Tabby Escamilla MD:  The scribe's documentation has been prepared under my direction and personally reviewed by me in its entirety.   I confirm that the note above accurately reflects all work, treatment, procedures, and medical decision making performed by me. NOTE: This report was edited using voice recognition software. Every effort was made to ensure accuracy; however, inadvertent computerized transcription errors may be present.             aGb Israel MD  12/17/19 9748       Gab Israel MD  12/17/19 0120       Gab Israel MD  12/17/19 0610

## 2019-12-18 NOTE — PROGRESS NOTES
Physical Therapy  Initial Assessment     Name: Diana Sabillon  : 1927  MRN: [id number]    Date of Service: 2019    Evaluating PT: Cristine Guevara, PT, DPT [id number]    Room #:  0571/2364-Y    Diagnosis: Weakness  Precautions: Fall risk, O2, alarm  PMHx: COPD    Pt lives alone in a single story house with 2 stair(s) and 1 grab bar to enter. Bed is on the first floor and bath is on the first floor. Pt ambulated with quad cane Mod Independent prior to admission. Pt also owns [employer] and uses it PRN. HPI: Pt presented to the ED on  for weakness and diarrhea. Initial Evaluation  Date: 19 Treatment Date: Short Term/ Long Term   Goals   AM-PAC 6 Clicks      Was pt agreeable to Eval/treatment? Yes     Does pt have pain? No current complaints of pain     Bed Mobility  Rolling: NT  Supine to sit: Mod A  Sit to supine: Max A  Scooting: Mod A toward EOB  Rolling: SBA  Supine to sit: SBA  Sit to supine: SBA  Scooting: SBA   Transfers Sit to stand: Mod A  Stand to sit: Mod A  Stand pivot: NT  Sit to stand: SBA  Stand to sit: SBA  Stand pivot: SBA with [employer]   Ambulation   Sidestepped 1 foot to L with [employer] with Mod A  >50 feet with [employer] with SBA   Stair negotiation: NT  2 step(s) with 1 rail(s) with SBA   ROM B UE: NT  B LE: WFL     Strength B UE: NT  B LE: 4-/5 grossly     Balance Sitting EOB: SBA  Dynamic standing: Mod A with [employer]  Sitting EOB: Independent  Dynamic standing: SBA with WW     Pt is A & O x: 3 grossly to person, place, and month. Sensation: Pt reports chronic B hands and feet numbness and tingling. Coordination: NT  Edema: Unremarkable. ASSESSMENT    Patient education  Pt educated on sequencing when sidestepping at EOB. Patient response to education:   Pt verbalized understanding Pt demonstrated skill Pt requires further education in this area   Yes With assistance Yes     Comments:  Pt was supine in bed upon room entry, agreeable to PT evaluation.  Pt is slightly confused but very pleasant and able to follow commands. Pt has poor activity tolerance and had mild to moderate SOB with activity. Pt remained on 2 L O2/min throughout session and O2 sat ranged from 94-97% with activity. Pt required assistance to complete scoot toward EOB during supine to sit transfer. Pt performed sit to stand and sidestepped at EOB. Pt takes small unsteady steps. Pt was very fatigued with light activity and requested to sit. Pt was assisted back to supine position. SOB improved once resting in bed. Pt was left supine in bed with all needs met at conclusion of session. Pts/family goals: To return home. Patient and or family understand(s) diagnosis, prognosis, and plan of care:  Yes. PLAN  PT care will be provided in accordance with the objectives noted above. Whenever appropriate, clear delegation orders will be provided for nursing staff. Exercises and functional mobility practice will be used as well as appropriate assistive devices or modalities to obtain goals. Patient and family education will also be administered as needed. Frequency of treatments: 2-5x/week x 2-3 days.     Time in: 0850  Time out: [address] [facility], PT, DPT  [id number]

## 2019-12-18 NOTE — ED NOTES
Blood cultures obtained from L hand, per policy. Set (one of two drawn at this time.  Nena Gregg RN  12/17/19 0989

## 2019-12-18 NOTE — CONSULTS
Nephrology Consult Note    Patient's Name: Carter Diop  11:28 AM  12/18/2019    Nephrologist: unknown  Reason for Consult:  ISRA on CKD  Requesting Physician:  No primary care provider on file. Chief Complaint:  diarrhea    History Obtained From:  patient, relative(s) and past medical records. History of Present Ilness:    Carter Diop is a 80 y.o. female with prior history cardiac disease and kidney disease. She is somewhat of a poor historian and used to go to [facility].  She does follow with cardiology and nephrology as an outpatient, but does not recall the names of the doctors. She lives at home alone and her family will check in on her. She ambulates with a walker. She does wear adult diapers. She admits to multiple episodes of diarrhea and nausea recently. She denies any dysuria, falls, loss of consciousness, fevers, chills. History reviewed. No pertinent past medical history. History reviewed. No pertinent surgical history. History reviewed. No pertinent family history. reports that she has never smoked. She has never used smokeless tobacco. She reports previous alcohol use. She reports that she does not use drugs. Allergies:  Penicillins    Current Medications:    0.9 % sodium chloride infusion, Continuous  sodium chloride flush 0.9 % injection 10 mL, 2 times per day  sodium chloride flush 0.9 % injection 10 mL, PRN  magnesium hydroxide (MILK OF MAGNESIA) 400 MG/5ML suspension 30 mL, Daily PRN  ondansetron (ZOFRAN) injection 4 mg, Q6H PRN  enoxaparin (LOVENOX) injection 30 mg, Daily  ipratropium-albuterol (DUONEB) nebulizer solution 1 ampule, Q4H WA  acetaminophen (TYLENOL) tablet 650 mg, Q4H PRN  cefTRIAXone (ROCEPHIN) 1 g in sterile water 10 mL IV syringe, Q24H  metronidazole (FLAGYL) 500 mg in NaCl 100 mL IVPB premix, Q8H        Review of Systems:   Pertinent items are noted in HPI.     Physical exam:   Constitutional:    Vitals: BP (!) 142/64   Pulse 78 technique included one of the following options; 1. Automated exposure control, 2. Adjustment of mA and/or kV according to the patient's size or 3. Use of iterative reconstruction. FINDINGS: No evidence of acute intracranial hemorrhage or edema. There is remote/old lacunar stroke involving head of the caudate nucleus on the left. No abnormal extra-axial fluid collections. Paranasal sinuses and mastoid air cells are clear. 1. No acute intracranial hemorrhage or edema. 2. 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 Xr Chest Portable    Result Date: 2019  Patient MRN:  [id number] : 1927 Age: 80 years Gender: Female Order Date:  2019 8:30 PM EXAM: XR CHEST PORTABLE COMPARISON: None INDICATION:  weak weak FINDINGS: There are opacities in retrocardiac left lower lobe and left costophrenic sulcus. The heart appears be normal in size. There is mild prominence of pulmonary vasculature. No pneumothorax. Findings could suggest pneumonia, atelectasis or effusion at left lung base. Assessment  1. ISRA on CKD of unknown stage likely multifactorial with history of urinary incontinence  2. Acute on chronic anemia likely dilutional, Ferritin 293, Iron 149, Iron sat 83, TIBC 179, Folate 14.1, B12 912  3. Interstitial pulmonary edema versus pneumonia  4. Left lower quadrant abdominal pain with concern for diverticulitis    Plan  1. Check urine studies and postvoid residual  2. Anemia panel reviewed  3. Monitor renal function and hemoglobin  4. Stop IVFs and give 20mg Lasix x1 now  5.  Rocephin, Doxy, Flagyl per admitting

## 2019-12-19 NOTE — PROGRESS NOTES
Subjective: The patient is awake and alert. No acute events overnight. Feels \"lousy \"   Some SOB   ongoing pain in llq     Objective:    BP (!) 149/59   Pulse 81   Temp 98.6 °F (37 °C) (Temporal)   Resp 18   Ht 5' 2\" (1.575 m)   Wt 132 lb 8 oz (60.1 kg)   SpO2 96%   BMI 24.23 kg/m²     In: 3856 [P.O.:120; I.V.:1200]  Out: 1800     HEENT: NCAT,  PERRLA, No JVD  Heart:  RRR, no murmurs, gallops, or rubs. Lungs:  CTA bilaterally, no wheeze, rales or rhonchi  Abd: tender to palpation in left lq and ruq   Extrem:  No clubbing, cyanosis, or edema     Recent Labs     12/17/19 2034 12/18/19  0746   WBC 7.7 6.3   HGB 10.3* 8.5*   HCT 31.5* 25.9*    107*       Recent Labs     12/17/19 2034 12/18/19  0746 12/19/19  0816    142 148*   K 4.9 4.4 4.3    110* 112*   CO2 25 21* 24   BUN 35* 34* 35*   CREATININE 2.2* 2.0* 2.2*   CALCIUM 9.4 8.5* 8.7       Assessment:    Patient Active Problem List   Diagnosis    Weakness    Jaundice    CKD (chronic kidney disease)    PNA (pneumonia)       Plan:    Admitted to [facility] for evaluation of Acute renal failure on CKD   Likely prerenal from dehydration   Hypernatremia today 148  Stop nephrotoxins   On diuretic per renal for pulm edema on ct - creat mildly increased to 2.2 today   Renal eval appreciated      Elevated LFT's and total bili with jaundice   gen surgery eval appreciated   MRCP today         uti /?  Diverticulitis   IV rocephin and flagyl  Daily labs       DVT Prophylaxis   PT/OT  Discharge planning       All consultants notes reviewed    Abbie Chavez MD  9:30 AM  12/19/2019

## 2019-12-19 NOTE — PROGRESS NOTES
Note made of significant thickened appearance of the endometrial stripe with areas of calcification. Liver is unremarkable. Multiple tiny gallstones layer within the gallbladder. No evidence of acute pancreatitis. There is minimal peripancreatic ascites. No renal or ureteric calculus. There is no hydronephrosis. View of the lung bases shows peribronchial thickening with airspace opacities at lung bases and small bilateral pleural effusions. 1. Findings are suggestive of interstitial pulmonary edema or pneumonia in visualized lower lung fields with minimal bilateral pleural effusions. 2. Significant thickened appearance of endometrial stripe concerning for a pedunculated polyp, endometrial hyperplasia, or endometrial cancer. Clinical correlation recommended. 3. Diverticulosis without evidence of acute diverticulitis. 4. Minimal perisplenic ascites. 5. Cholelithiasis. ALERT:  THIS IS AN ABNORMAL REPORT    Ct Head Wo Contrast    Result Date: 2019  Patient MRN:  [id number] : 1927 Age: 80 years Gender: Female Order Date:  2019 9:15 PM EXAM: CT HEAD WO CONTRAST COMPARISON: None INDICATION:  Evaluate intracranial abnormality Evaluate intracranial abnormality TECHNIQUE: Axial unenhanced CT scanning was performed through the head without the use of intravenous contrast. Low-dose CT acquisition technique included one of the following options; 1. Automated exposure control, 2. Adjustment of mA and/or kV according to the patient's size or 3. Use of iterative reconstruction. FINDINGS: No evidence of acute intracranial hemorrhage or edema. There is remote/old lacunar stroke involving head of the caudate nucleus on the left. No abnormal extra-axial fluid collections. Paranasal sinuses and mastoid air cells are clear. 1. No acute intracranial hemorrhage or edema. 2. 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     Xr Chest Portable    Result Date: 2019  Patient MRN:

## 2019-12-19 NOTE — PROGRESS NOTES
Central supply stating bladder scanner still unavailable. Unable to preform PVR until bladder scanner available. Nurse manager and Rgahu Bedoya notified.

## 2019-12-20 NOTE — PROGRESS NOTES
CMP:    Lab Results   Component Value Date     2019    K 4.2 2019     2019    CO2 23 2019    BUN 36 2019    CREATININE 2.2 2019    GFRAA 25 2019    LABGLOM 21 2019    GLUCOSE 113 2019    PROT 5.6 2019    LABALBU 2.9 2019    CALCIUM 8.3 2019    BILITOT 1.2 2019    ALKPHOS 150 2019    AST 30 2019    ALT 17 2019     BMP:    Lab Results   Component Value Date     2019    K 4.2 2019     2019    CO2 23 2019    BUN 36 2019    LABALBU 2.9 2019    CREATININE 2.2 2019    CALCIUM 8.3 2019    GFRAA 25 2019    LABGLOM 21 2019    GLUCOSE 113 2019     Calcium:    Lab Results   Component Value Date    CALCIUM 8.3 2019     U/A:    Lab Results   Component Value Date    NITRU Negative 2019    COLORU Yellow 2019    PHUR 7.0 2019    WBCUA 2-5 2019    RBCUA 2-5 2019    BACTERIA MANY 2019    CLARITYU SL CLOUDY 2019    SPECGRAV 1.010 2019    LEUKOCYTESUR SMALL 2019    UROBILINOGEN 0.2 2019    BILIRUBINUR Negative 2019    BLOODU SMALL 2019    GLUCOSEU Negative 2019    KETUA Negative 2019        Imaging:  Ct Abdomen Pelvis Wo Contrast    Result Date: 2019  Patient MRN:  [id number] : 1927 Age: 80 years Gender: Female Order Date:  2019 9:15 PM EXAM: CT ABDOMEN PELVIS WO CONTRAST COMPARISON: None INDICATION:  Pain Pain TECHNIQUE:  Low-dose CT acquisition technique included one of the following options; 1. Automated exposure control, 2. Adjustment of mA and/or kV according to the patient's size or 3. Use of iterative reconstruction. FINDINGS: There is no evidence of bowel obstruction or free air in the abdomen or pelvis. Note made of significant thickened appearance of the endometrial stripe with areas of calcification. Liver is unremarkable.  Multiple tiny gallstones layer within the gallbladder. No evidence of acute pancreatitis. There is minimal peripancreatic ascites. No renal or ureteric calculus. There is no hydronephrosis. View of the lung bases shows peribronchial thickening with airspace opacities at lung bases and small bilateral pleural effusions. 1. Findings are suggestive of interstitial pulmonary edema or pneumonia in visualized lower lung fields with minimal bilateral pleural effusions. 2. Significant thickened appearance of endometrial stripe concerning for a pedunculated polyp, endometrial hyperplasia, or endometrial cancer. Clinical correlation recommended. 3. Diverticulosis without evidence of acute diverticulitis. 4. Minimal perisplenic ascites. 5. Cholelithiasis. ALERT:  THIS IS AN ABNORMAL REPORT    Ct Head Wo Contrast    Result Date: 2019  Patient MRN:  [id number] : 1927 Age: 80 years Gender: Female Order Date:  2019 9:15 PM EXAM: CT HEAD WO CONTRAST COMPARISON: None INDICATION:  Evaluate intracranial abnormality Evaluate intracranial abnormality TECHNIQUE: Axial unenhanced CT scanning was performed through the head without the use of intravenous contrast. Low-dose CT acquisition technique included one of the following options; 1. Automated exposure control, 2. Adjustment of mA and/or kV according to the patient's size or 3. Use of iterative reconstruction. FINDINGS: No evidence of acute intracranial hemorrhage or edema. There is remote/old lacunar stroke involving head of the caudate nucleus on the left. No abnormal extra-axial fluid collections. Paranasal sinuses and mastoid air cells are clear. 1. No acute intracranial hemorrhage or edema. 2. 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     Xr Chest Portable    Result Date: 2019  Patient MRN:  [id number] : 1927 Age: 80 years Gender: Female Order Date:  2019 8:30 PM EXAM: XR CHEST PORTABLE COMPARISON: None INDICATION:

## 2019-12-20 NOTE — PLAN OF CARE
Problem: Falls - Risk of:  Goal: Will remain free from falls  Description  Will remain free from falls  Outcome: Met This Shift     Problem: Breathing Pattern - Ineffective:  Goal: Ability to achieve and maintain a regular respiratory rate will improve  Description  Ability to achieve and maintain a regular respiratory rate will improve  Outcome: Not Met This Shift

## 2019-12-20 NOTE — PROGRESS NOTES
[facility]  PROGRESS NOTE  ATTENDING NOTE    Looks better than yesterday, but still doesn't feel well, doesn't wish to eat. Denies abdominal pain    /70   Pulse 78   Temp 98 °F (36.7 °C) (Temporal)   Resp 19   Ht 5' 2\" (1.575 m)   Wt 132 lb 8 oz (60.1 kg)   SpO2 96%   BMI 24.23 kg/m²   Physical Exam  HENT:      Head: Normocephalic and atraumatic. Nose: Nose normal.      Mouth/Throat:      Mouth: Mucous membranes are dry. Eyes:      General: No scleral icterus. Extraocular Movements: Extraocular movements intact. Pupils: Pupils are equal, round, and reactive to light. Neck:      Musculoskeletal: Normal range of motion and neck supple. Abdominal:      General: There is no distension. Palpations: Abdomen is soft. Tenderness: There is no tenderness (mild epigastric TTP). Skin:     General: Skin is warm and dry. Neurological:      General: No focal deficit present. Mental Status: She is oriented to person, place, and time.      ASSESSMENT/PLAN:  Hyperbilirubinemia--improving, could be related to dehydration and cholestasis  --f/u MRCP--cholelithiasis  --had prior lia tube    No surgical intervention needed, will s/o please call if needed    Elma Bolanos MD, MSc, FACS  12/20/2019  5:15 PM

## 2019-12-21 NOTE — PLAN OF CARE
Problem: Falls - Risk of:  Goal: Will remain free from falls  Description  Will remain free from falls  12/21/2019 0412 by Ofe Veliz RN  Outcome: Met This Shift  12/20/2019 1840 by Emi Martinez RN  Outcome: Met This Shift  Goal: Absence of physical injury  Description  Absence of physical injury  12/21/2019 0412 by Ofe Veliz RN  Outcome: Met This Shift  12/20/2019 1840 by Emi Martinez RN  Outcome: Met This Shift     Problem: Breathing Pattern - Ineffective:  Goal: Ability to achieve and maintain a regular respiratory rate will improve  Description  Ability to achieve and maintain a regular respiratory rate will improve  12/21/2019 0412 by Ofe Veliz RN  Outcome: Met This Shift  12/20/2019 1840 by Emi Martinez RN  Outcome: Met This Shift

## 2019-12-21 NOTE — PROGRESS NOTES
MCHC 32.8 2019    RDW 15.9 2019     2019    MPV 13.4 2019     CMP:    Lab Results   Component Value Date     2019    K 4.0 2019     2019    CO2 21 2019    BUN 31 2019    CREATININE 2.2 2019    GFRAA 25 2019    LABGLOM 21 2019    GLUCOSE 95 2019    PROT 5.6 2019    LABALBU 2.9 2019    CALCIUM 8.3 2019    BILITOT 1.2 2019    ALKPHOS 150 2019    AST 30 2019    ALT 17 2019     BMP:    Lab Results   Component Value Date     2019    K 4.0 2019     2019    CO2 21 2019    BUN 31 2019    LABALBU 2.9 2019    CREATININE 2.2 2019    CALCIUM 8.3 2019    GFRAA 25 2019    LABGLOM 21 2019    GLUCOSE 95 2019     Calcium:    Lab Results   Component Value Date    CALCIUM 8.3 2019     U/A:    Lab Results   Component Value Date    NITRU Negative 2019    COLORU Yellow 2019    PHUR 7.0 2019    WBCUA 2-5 2019    RBCUA 2-5 2019    BACTERIA MANY 2019    CLARITYU SL CLOUDY 2019    SPECGRAV 1.010 2019    LEUKOCYTESUR SMALL 2019    UROBILINOGEN 0.2 2019    BILIRUBINUR Negative 2019    BLOODU SMALL 2019    GLUCOSEU Negative 2019    KETUA Negative 2019        Imaging:  Ct Abdomen Pelvis Wo Contrast    Result Date: 2019  Patient MRN:  [id number] : 1927 Age: 80 years Gender: Female Order Date:  2019 9:15 PM EXAM: CT ABDOMEN PELVIS WO CONTRAST COMPARISON: None INDICATION:  Pain Pain TECHNIQUE:  Low-dose CT acquisition technique included one of the following options; 1. Automated exposure control, 2. Adjustment of mA and/or kV according to the patient's size or 3. Use of iterative reconstruction. FINDINGS: There is no evidence of bowel obstruction or free air in the abdomen or pelvis.  Note made of significant thickened appearance of the endometrial stripe with areas of calcification. Liver is unremarkable. Multiple tiny gallstones layer within the gallbladder. No evidence of acute pancreatitis. There is minimal peripancreatic ascites. No renal or ureteric calculus. There is no hydronephrosis. View of the lung bases shows peribronchial thickening with airspace opacities at lung bases and small bilateral pleural effusions. 1. Findings are suggestive of interstitial pulmonary edema or pneumonia in visualized lower lung fields with minimal bilateral pleural effusions. 2. Significant thickened appearance of endometrial stripe concerning for a pedunculated polyp, endometrial hyperplasia, or endometrial cancer. Clinical correlation recommended. 3. Diverticulosis without evidence of acute diverticulitis. 4. Minimal perisplenic ascites. 5. Cholelithiasis. ALERT:  THIS IS AN ABNORMAL REPORT    Ct Head Wo Contrast    Result Date: 2019  Patient MRN:  [id number] : 1927 Age: 80 years Gender: Female Order Date:  2019 9:15 PM EXAM: CT HEAD WO CONTRAST COMPARISON: None INDICATION:  Evaluate intracranial abnormality Evaluate intracranial abnormality TECHNIQUE: Axial unenhanced CT scanning was performed through the head without the use of intravenous contrast. Low-dose CT acquisition technique included one of the following options; 1. Automated exposure control, 2. Adjustment of mA and/or kV according to the patient's size or 3. Use of iterative reconstruction. FINDINGS: No evidence of acute intracranial hemorrhage or edema. There is remote/old lacunar stroke involving head of the caudate nucleus on the left. No abnormal extra-axial fluid collections. Paranasal sinuses and mastoid air cells are clear. 1. No acute intracranial hemorrhage or edema. 2. 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     Xr Chest Portable    Result Date: 2019  Patient MRN:  [id number] : 1927 Age: 80 years Gender:

## 2019-12-22 NOTE — PROGRESS NOTES
Nephrology Progress Note    Patient's Name: Deyvi Ochoa  4:38 PM  12/22/2019    Nephrologist: unknown  Reason for Consult:  ISRA on CKD  Requesting Physician:  Dr. Adan Montesinos  Chief Complaint:  diarrhea    Sub: Reports feeling better today, breathing improved  States PO intake remains very poor    Allergies:  Penicillins    Current Medications:    metoprolol tartrate (LOPRESSOR) tablet 25 mg, Daily  furosemide (LASIX) tablet 20 mg, Daily  sodium chloride flush 0.9 % injection 10 mL, 2 times per day  sodium chloride flush 0.9 % injection 10 mL, PRN  magnesium hydroxide (MILK OF MAGNESIA) 400 MG/5ML suspension 30 mL, Daily PRN  ondansetron (ZOFRAN) injection 4 mg, Q6H PRN  enoxaparin (LOVENOX) injection 30 mg, Daily  ipratropium-albuterol (DUONEB) nebulizer solution 1 ampule, Q4H WA  acetaminophen (TYLENOL) tablet 650 mg, Q4H PRN  cefTRIAXone (ROCEPHIN) 1 g in sterile water 10 mL IV syringe, Q24H  metronidazole (FLAGYL) 500 mg in NaCl 100 mL IVPB premix, Q8H  aspirin chewable tablet 81 mg, Daily  isosorbide dinitrate (ISORDIL) tablet 5 mg, BID  levothyroxine (SYNTHROID) tablet 50 mcg, Daily            Physical exam:   Constitutional:    Vitals: BP (!) 132/59   Pulse 71   Temp 97 °F (36.1 °C) (Temporal)   Resp 14   Ht 5' 2\" (1.575 m)   Wt 132 lb 8 oz (60.1 kg)   SpO2 91% Comment: Dr. Adan Montesinos aware  BMI 24.23 kg/m²      General: Elderly female, lying in bed in no acute   respiratory distress  Skin: no rash, turgor wnl, warm  Heent:  eomi, mmm  Neck: no bruits or jvd noted  Cardiovascular:  S1, S2 without m/r/g  Respiratory: CTA B with rales  Abdomen:  +bs, soft, positive left lower quadrant tenderness  Ext: no lower extremity edema  Psychiatric: mood and affect appropriate  Musculoskeletal:  Rom, muscular strength intact     Labs:  CBC:   Lab Results   Component Value Date    WBC 5.6 12/21/2019    RBC 2.20 12/21/2019    HGB 8.0 12/21/2019    HCT 25.8 12/21/2019    .3 12/21/2019    MCH 36.4 12/21/2019    MCHC the endometrial stripe with areas of calcification. Liver is unremarkable. Multiple tiny gallstones layer within the gallbladder. No evidence of acute pancreatitis. There is minimal peripancreatic ascites. No renal or ureteric calculus. There is no hydronephrosis. View of the lung bases shows peribronchial thickening with airspace opacities at lung bases and small bilateral pleural effusions. 1. Findings are suggestive of interstitial pulmonary edema or pneumonia in visualized lower lung fields with minimal bilateral pleural effusions. 2. Significant thickened appearance of endometrial stripe concerning for a pedunculated polyp, endometrial hyperplasia, or endometrial cancer. Clinical correlation recommended. 3. Diverticulosis without evidence of acute diverticulitis. 4. Minimal perisplenic ascites. 5. Cholelithiasis. ALERT:  THIS IS AN ABNORMAL REPORT    Ct Head Wo Contrast    Result Date: 2019  Patient MRN:  [id number] : 1927 Age: 80 years Gender: Female Order Date:  2019 9:15 PM EXAM: CT HEAD WO CONTRAST COMPARISON: None INDICATION:  Evaluate intracranial abnormality Evaluate intracranial abnormality TECHNIQUE: Axial unenhanced CT scanning was performed through the head without the use of intravenous contrast. Low-dose CT acquisition technique included one of the following options; 1. Automated exposure control, 2. Adjustment of mA and/or kV according to the patient's size or 3. Use of iterative reconstruction. FINDINGS: No evidence of acute intracranial hemorrhage or edema. There is remote/old lacunar stroke involving head of the caudate nucleus on the left. No abnormal extra-axial fluid collections. Paranasal sinuses and mastoid air cells are clear. 1. No acute intracranial hemorrhage or edema. 2. 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     Xr Chest Portable    Result Date: 2019  Patient MRN:  [id number] : 1927 Age: 80 years Gender: Female Order Date:  12/17/2019 8:30 PM EXAM: XR CHEST PORTABLE COMPARISON: None INDICATION:  weak weak FINDINGS: There are opacities in retrocardiac left lower lobe and left costophrenic sulcus. The heart appears be normal in size. There is mild prominence of pulmonary vasculature. No pneumothorax. Findings could suggest pneumonia, atelectasis or effusion at left lung base. Results for Abebe Medina (MRN [id number]) as of 12/19/2019 16:16   Ref. Range 12/18/2019 15:50   Chloride Latest Ref Range: Not Established mmol/L <20   Creatinine, Ur Latest Ref Range: 29 - 226 mg/dL 116   Osmolality, Ur Latest Ref Range: 300 - 900 mOsm/kg 429   Potassium, Ur Latest Ref Range: Not Established mmol/L 47.0   Sodium, Ur Latest Ref Range: Not Established mmol/L 31     Assessment  1. ISRA on CKD of unknown stage likely multifactorial with history of urinary incontinence  2. Acute on chronic anemia likely dilutional, Ferritin 293, Iron 149, Iron sat 83, TIBC 179, Folate 14.1, B12 912  3. Interstitial pulmonary edema versus pneumonia  4. Left lower quadrant abdominal pain with concern for diverticulitis    Plan:  1. Encouraged free water intake  2.  Lasix 20 mg PO daily for volume control    OK to discharge from renal standpoint, discussed with the RN

## 2019-12-22 NOTE — PROGRESS NOTES
General surgery asked to re-evaluate patient per abnormal CT scan results today. Patient continues to have intermittent mild abdominal pain, mostly in LLQ. She has been tolerating diet (does not exacerbate pain) and is having bowel movements including today. Currently she has no pain at rest. Upon palpation, she has mild tenderness in LLQ. No pain in RUQ or elsewhere. CT scan read possible cholecystitis, but upon personal read, it does not appear she has developed cholecystitis on the interim and her symptoms/exam do not suggest this. She had some LLQ tenderness but today's CT does not show diverticulitis. Recommend continued supportive care and no surgical intervention.  Thank you for updating our team.    Discussed with attending Dr Vik Grey    Electronically signed by Pelon Lopez MD on 12/21/2019 at 11:26 PM

## 2019-12-22 NOTE — DISCHARGE SUMMARY
Physician Discharge Summary     Patient ID:  Ronald Vinson  [id number]  41 y.o.  1/17/1927    Admit date: 12/17/2019    Discharge date and time: 1/2/2020    Admission Diagnoses:   Patient Active Problem List   Diagnosis    Weakness    Jaundice    CKD (chronic kidney disease)    PNA (pneumonia)    Abdominal pain   Acute on Chronic Diastolic CHF/HFpEF   Pneumonia ruled out  Discharge Diagnoses: abdominal pain ,acute on chronic  renal insuff , hypernatremia     Consults:  Renal , gen surg     Procedures: none     Hospital Course: patient is a 80 y.o. female of dr. Mayank Guadarrama with significant past medical history of no existing medical conditions except \"CHF \" who presents with complaints of profound weakness. She lives on her own and does all ADL's independently - even cooking for herself. Over the past few days she has felt very weak and tired. Per ed notes - also diarrhea. She is not able to provide much history aprt from this. She indicates they had put a drain in for her gall bladder about a year ago , but no surgery was done. ( no records on chart ). She complains of left lower quad pain in abdomen. BP (!) 142/64   Pulse 78   Temp 98.9 °F (37.2 °C) (Temporal)   Resp 18   Ht 5' 2\" (1.575 m)   Wt 132 lb 8 oz (60.1 kg)   SpO2 97%   BMI 24.23 kg/m²   Blood work reveals elevates ast/alt and lipase 44/92/4.8 , lipase 65   Acute renal failure  Bun creat 35/2.2   bnp 3500  CT abdomen with edema lungs, cholelithiasis, endometrial strip      She was treated conservatively . Sh received abx therapy for ? Diverticulitis and abdominal pain d/t h/o recent drain in gb. She was noted to have pl effusions on cxr. sats have remained ok. she is better clinically . She has chosen to go for rehab. Nina Young for [employer] . F/u labs in 3 days and CXR. No results for input(s): WBC, HGB, HCT, PLT in the last 72 hours. No results for input(s): NA, K, CL, CO2, BUN, CREATININE, CALCIUM in the last 72 hours.     Invalid input(s): GLU    Ct Adjustment of mA and/or kV according to the patient's size or 3. Use of iterative reconstruction. FINDINGS: No evidence of acute intracranial hemorrhage or edema. There is remote/old lacunar stroke involving head of the caudate nucleus on the left. No abnormal extra-axial fluid collections. Paranasal sinuses and mastoid air cells are clear. 1. No acute intracranial hemorrhage or edema. 2. 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 Xr Chest Portable    Result Date: 2019  Patient MRN:  [id number] : 1927 Age: 80 years Gender: Female Order Date:  2019 8:30 PM EXAM: XR CHEST PORTABLE COMPARISON: None INDICATION:  weak weak FINDINGS: There are opacities in retrocardiac left lower lobe and left costophrenic sulcus. The heart appears be normal in size. There is mild prominence of pulmonary vasculature. No pneumothorax. Findings could suggest pneumonia, atelectasis or effusion at left lung base. Discharge Exam:    HEENT: NCAT,  PERRLA, No JVD  Heart:  RRR, no murmurs, gallops, or rubs.   Lungs:  CTA bilaterally, no wheeze, rales or rhonchi  Abd: bowel sounds present, nontender, nondistended, no masses  Extrem:  No clubbing, cyanosis, or edema    Disposition:[facility]    Patient Condition at [address] [place] [address]     Patient Instructions:      Medication List      CHANGE how you take these medications    furosemide 20 MG tablet  Commonly known as:  LASIX  Take 1 tablet by mouth daily  What changed:    · medication strength  · how much to take     metoprolol tartrate 25 MG tablet  Commonly known as:  LOPRESSOR  Take 1 tablet by mouth daily  What changed:  how much to take        CONTINUE taking these medications    aspirin 81 MG chewable tablet     isosorbide dinitrate 10 MG tablet  Commonly known as:  ISORDIL     levothyroxine 50 MCG tablet  Commonly known as:  SYNTHROID     potassium chloride 10 MEQ extended release capsule  Commonly known as:  MICRO-K           Where to Get Your Medications      These medications were sent to [facility], [address], [address] [place] [id number]    Phone:  [phone]   · furosemide 20 MG tablet  · metoprolol tartrate 25 MG tablet       Activity: activity as tolerated  Diet: regular diet and renal diet    Pt has been advised to: Follow-up with Osito Lara MD in 1 week.   Follow-up with consultants as recommended by them    Note that over 30 minutes was spent in preparing discharge papers, discussing discharge with patient, medication review, etc.    Signed:  Vik Urbina MD  1/2/2020  11:40 AM

## 2019-12-22 NOTE — DISCHARGE INSTR - COC
Last documented pain score (0-10 scale): Pain Level: 1  Last Weight:   Wt Readings from Last 1 Encounters:   12/18/19 132 lb 8 oz (60.1 kg)     Mental Status:  oriented and alert    IV Access:  - None    Nursing Mobility/ADLs:  Walking   Assisted  Transfer  Assisted  Bathing  Assisted  Dressing  Assisted  Toileting  Assisted  Feeding  [facility] Delivery   whole    Wound Care Documentation and Therapy:        Elimination:  Continence:   · Bowel: Yes  · Bladder: Yes  Urinary Catheter: None   Colostomy/Ileostomy/Ileal Conduit: No       Date of Last BM: 12/22    Intake/Output Summary (Last 24 hours) at 12/22/2019 1206  Last data filed at 12/22/2019 1039  Gross per 24 hour   Intake 290 ml   Output 300 ml   Net -10 ml     I/O last 3 completed shifts: In: 110 [I.V.:10; IV Piggyback:100]  Out: 350 [Urine:350]    Safety Concerns:     None    Impairments/Disabilities:      None    Nutrition Therapy:  Current Nutrition Therapy:   - Oral Diet:  Cardiac    Routes of Feeding: Oral  Liquids: No Restrictions  Daily Fluid Restriction: no  Last Modified Barium Swallow with Video (Video Swallowing Test): not done    Treatments at the Time of Hospital Discharge:   Respiratory Treatments:   Oxygen Therapy:  is not on home oxygen therapy.   Ventilator:    - No ventilator support    Rehab Therapies: Physical Therapy and Occupational Therapy  Weight Bearing Status/Restrictions: No weight bearing restirctions  Other Medical Equipment (for information only, NOT a DME order):  bedside commode  Other Treatments:     Patient's personal belongings (please select all that are sent with patient):  None    RN SIGNATURE:  Electronically signed by Hussain Gonzalez RN on 12/22/19 at 12:51 PM    CASE MANAGEMENT/SOCIAL WORK SECTION    Inpatient Status Date: ***    Readmission Risk Assessment Score:  Readmission Risk              Risk of Unplanned Readmission:        13           Discharging to Facility/ Agency   · Name: · Address:  · Phone:  · Fax:    Dialysis Facility (if applicable)   · Name:  · Address:  · Dialysis Schedule:  · Phone:  · Fax:    / signature: {Esignature:[id number]}    PHYSICIAN SECTION    Prognosis: Fair    Condition at Discharge: Stable    Rehab Potential (if transferring to Rehab): Fair    Recommended Labs or Other Treatments After Discharge: cbc and bmp in 3 days     Physician Certification: I certify the above information and transfer of Suha Walters  is necessary for the continuing treatment of the diagnosis listed and that she requires [facility] for less 30 days.      Update Admission H&P: No change in H&P    PHYSICIAN SIGNATURE:  Electronically signed by Maryjo Blankenship MD on 12/22/19 at 12:07 PM

## 2019-12-30 PROBLEM — R10.9 ABDOMINAL PAIN: Status: ACTIVE | Noted: 2019-01-01
